# Patient Record
Sex: FEMALE | Race: WHITE | NOT HISPANIC OR LATINO | Employment: PART TIME | URBAN - METROPOLITAN AREA
[De-identification: names, ages, dates, MRNs, and addresses within clinical notes are randomized per-mention and may not be internally consistent; named-entity substitution may affect disease eponyms.]

---

## 2017-01-17 ENCOUNTER — GENERIC CONVERSION - ENCOUNTER (OUTPATIENT)
Dept: OTHER | Facility: OTHER | Age: 47
End: 2017-01-17

## 2017-02-07 ENCOUNTER — ALLSCRIPTS OFFICE VISIT (OUTPATIENT)
Dept: OTHER | Facility: OTHER | Age: 47
End: 2017-02-07

## 2017-02-10 LAB
HPV 18 (HISTORICAL): NOT DETECTED
HPV HIGH RISK 16/18 (HISTORICAL): NOT DETECTED
HPV16 (HISTORICAL): NOT DETECTED
PAP (HISTORICAL): NORMAL

## 2017-03-04 DIAGNOSIS — Z12.31 ENCOUNTER FOR SCREENING MAMMOGRAM FOR MALIGNANT NEOPLASM OF BREAST: ICD-10-CM

## 2017-03-10 ENCOUNTER — HOSPITAL ENCOUNTER (OUTPATIENT)
Dept: RADIOLOGY | Facility: HOSPITAL | Age: 47
Discharge: HOME | End: 2017-03-10
Attending: OBSTETRICS & GYNECOLOGY
Payer: COMMERCIAL

## 2017-03-10 DIAGNOSIS — Z12.31 ENCOUNTER FOR SCREENING MAMMOGRAM FOR MALIGNANT NEOPLASM OF BREAST: ICD-10-CM

## 2017-03-10 PROCEDURE — G0202 SCR MAMMO BI INCL CAD: HCPCS

## 2017-12-13 ENCOUNTER — GENERIC CONVERSION - ENCOUNTER (OUTPATIENT)
Dept: OTHER | Facility: OTHER | Age: 47
End: 2017-12-13

## 2018-01-11 NOTE — MISCELLANEOUS
Message  Pt informed ECC shows atypia/CIN1   advised repeat PAP/cotesting and ECC at 12 and 24 months  To begin 2/17  Questions answered  Signatures   Electronically signed by : Timmy Cohen DO;  Apr 16 2016 11:16AM EST                       (Author)

## 2018-01-12 VITALS
WEIGHT: 148 LBS | DIASTOLIC BLOOD PRESSURE: 62 MMHG | HEIGHT: 62 IN | BODY MASS INDEX: 27.23 KG/M2 | SYSTOLIC BLOOD PRESSURE: 120 MMHG

## 2018-01-15 NOTE — RESULT NOTES
Verified Results  (1) STOOL CULTURE 10Aug2016 07:29AM Laila Holcombmis   TW Order Number: [id number]     Test Name Result Flag Reference   CLINICAL REPORT (Report)     Test:        Stool culture  Specimen Source:  Rectum  Specimen Type:   Stool  Specimen Date:   8/10/2016 7:29 AM  Result Date:    8/14/2016 9:29 AM  Result Status:   Final result  Resulting Lab:   [place] [address]            Tel: [phone]      CULTURE                                       ------------------                                   No Salmonella, Shigella or Campylobacter isolated  Shiga Toxin 1 NOT detected, Shiga Toxin 2 NOT detected     (1) STOOL WBCS 10Aug2016 07:29AM Laila Jaycee    Order Number: [id number]     Test Name Result Flag Reference   STOOL WBC   None Seen   No white blood cells seen  Performed at:  [address] [place] [address]  [id number]  : Jonas Jameson MD, Phone:  [phone]     (1) OVA AND PARASITES EXAM [id number] 07:29AM Wyludivinawilian Champion    Order Number: [id number]     Test Name Result Flag Reference   O&P CONC  EXAM      No ova, cysts, or parasites seen     One negative specimen does not rule out the possibility of a  parasitic infection     Performed at:  [address] [place] [address]  [id number]  : Jonas Jameson MD, Phone:  [phone]

## 2018-01-17 NOTE — RESULT NOTES
Verified Results  (1) CBC/PLT/DIFF 10Aug2016 07:29AM Anitra Pan   TW Order Number: [id number]     Test Name Result Flag Reference   WBC COUNT 6 27 Thousand/uL  4 31-10 16   RBC COUNT 4 29 Million/uL  3 81-5 12   HEMOGLOBIN 13 4 g/dL  11 5-15 4   HEMATOCRIT 40 2 %  34 8-46  1   MCV 94 fL  82-98   MCH 31 2 pg  26 8-34 3   MCHC 33 3 g/dL  31 4-37 4   RDW 13 4 %  11 6-15 1   MPV 10 2 fL  8 9-12 7   PLATELET COUNT 085 Thousands/uL  149-390   nRBC AUTOMATED 0 /100 WBCs     NEUTROPHILS RELATIVE PERCENT 57 %  43-75   LYMPHOCYTES RELATIVE PERCENT 28 %  14-44   MONOCYTES RELATIVE PERCENT 11 %  4-12   EOSINOPHILS RELATIVE PERCENT 3 %  0-6   BASOPHILS RELATIVE PERCENT 1 %  0-1   NEUTROPHILS ABSOLUTE COUNT 3 57 Thousands/?L  1 85-7 62   LYMPHOCYTES ABSOLUTE COUNT 1 78 Thousands/?L  0 60-4 47   MONOCYTES ABSOLUTE COUNT 0 68 Thousand/?L  0 17-1 22   EOSINOPHILS ABSOLUTE COUNT 0 18 Thousand/?L  0 00-0 61   BASOPHILS ABSOLUTE COUNT 0 04 Thousands/?L  0 00-0 10   - Patient Instructions: This bloodwork is non-fasting  Please drink two glasses of water morning of bloodwork  - Patient Instructions: This bloodwork is non-fasting  Please drink two glasses of water morning of bloodwork  (1) COMPREHENSIVE METABOLIC PANEL [id number] 64:94YW Anitra Pan   Remind Order Number: [id number]     Test Name Result Flag Reference   GLUCOSE,RANDM 101 mg/dL     If the patient is fasting, the ADA then defines impaired fasting glucose as > 100 mg/dL and diabetes as > or equal to 123 mg/dL     SODIUM 138 mmol/L  136-145   POTASSIUM 4 3 mmol/L  3 5-5 3   CHLORIDE 107 mmol/L  100-108   CARBON DIOXIDE 22 mmol/L  21-32   ANION GAP (CALC) 9 mmol/L  4-13   BLOOD UREA NITROGEN 13 mg/dL  5-25   CREATININE 0 67 mg/dL  0 60-1 30   Standardized to IDMS reference method   CALCIUM 8 4 mg/dL  8 3-10 1   BILI, TOTAL 0 35 mg/dL  0 20-1 00   ALK PHOSPHATAS 42 U/L L    ALT (SGPT) 26 U/L  12-78   AST(SGOT) 17 U/L  5-45   ALBUMIN 3 6 g/dL  3 5-5 0   TOTAL PROTEIN 6 9 g/dL  6 4-8 2   eGFR Non-African American      >60 0 ml/min/1 73[address] [facility] Disease Education Program recommendations are as follows:  GFR calculation is accurate only with a steady state creatinine  Chronic Kidney disease less than 60 ml/min/1 73 sq  meters  Kidney failure less than 15 ml/min/1 73 sq  meters       (1) AMYLASE 10Aug2016 07:29AM Liv Kappa   TW Order Number: [id number]     Test Name Result Flag Reference   AMYLASE 51 IU/L       (1) LIPASE 10Aug2016 07:29AM Liv Kappa   TW Order Number: [id number]     Test Name Result Flag Reference   LIPASE 135 u/L

## 2018-01-18 NOTE — MISCELLANEOUS
Message   Recorded as Task   Date: 01/17/2017 04:02 PM, Created By: Clare Lane   Task Name: Med Renewal Request   Assigned To: Mary Peoples   Regarding Patient: Asa Bright, Status: Active   CommentMaire Draft - 17 Jan 2017 4:02 PM     TASK CREATED  Caller: Self; [phone] (Home); [phone] (Work)  pt lft vm wants rx to go to mail order pharm, [facility]        Active Problems    1  Abdominal pain, epigastric (789 06) (R10 13)   2  Abnormal Pap smear of cervix (795 00) (R87 619)   3  Abnormal Pap smear of cervix (795 00) (R87 619)   4  Candidiasis (112 9) (B37 9)   5  Change in stool (792 1) (R19 5)   6  Encounter for gynecological examination without abnormal finding (V72 31) (Z01 419)   7  Encounter for preventive health examination (V70 0) (Z00 00)   8  Encounter for routine gynecological examination (V72 31) (Z01 419)   9  Encounter for screening mammogram for malignant neoplasm of breast (V76 12)   (Z12 31)   10  Mild cervical dysplasia (622 11) (N87 0)   11  Oral contraceptive prescribed (V25 01) (Z30 011)   12  Palpitations (785 1) (R00 2)   13  Postcoital bleeding (626 7) (N93 0)   14  Screening for human papillomavirus (HPV) (V73 81) (Z11 51)    Current Meds   1  Calcium 500 MG TABS; TAKE 1 TABLET DAILY; Therapy: (ERAGHABJ:59MNW9789) to Recorded   2  Daily Multiple Vitamins TABS; TAKE 1 TABLET DAILY; Therapy: (VEGCORJU:58AIJ9144) to Recorded   3  Norethin Ace-Eth Estrad-FE 1-20 MG-MCG(24) Oral Tablet; Take 1 tablet daily as   directed; Therapy: 54FZM5448 to ()  Requested for: 75CAZ9293; Last   Rx:12Oct2016 Ordered    Allergies    1  No Known Drug Allergies    Plan  Health Maintenance, Oral contraceptive prescribed    · Norethin Ace-Eth Estrad-FE 1-20 MG-MCG(24) Oral Tablet;  Take 1 tablet daily as  directed    Signatures   Electronically signed by : Cassidy Bateman, ; Jan 17 2017  4:02PM EST                       (Author)

## 2018-01-24 VITALS
HEART RATE: 74 BPM | WEIGHT: 140.5 LBS | TEMPERATURE: 98.4 F | HEIGHT: 62 IN | SYSTOLIC BLOOD PRESSURE: 118 MMHG | DIASTOLIC BLOOD PRESSURE: 66 MMHG | BODY MASS INDEX: 25.86 KG/M2 | RESPIRATION RATE: 14 BRPM

## 2018-02-12 ENCOUNTER — OFFICE VISIT (OUTPATIENT)
Dept: OBGYN CLINIC | Facility: CLINIC | Age: 48
End: 2018-02-12
Payer: COMMERCIAL

## 2018-02-12 VITALS
DIASTOLIC BLOOD PRESSURE: 74 MMHG | WEIGHT: 143 LBS | BODY MASS INDEX: 26.31 KG/M2 | SYSTOLIC BLOOD PRESSURE: 118 MMHG | HEIGHT: 62 IN

## 2018-02-12 DIAGNOSIS — R53.83 FATIGUE, UNSPECIFIED TYPE: ICD-10-CM

## 2018-02-12 DIAGNOSIS — Z12.31 ENCOUNTER FOR SCREENING MAMMOGRAM FOR MALIGNANT NEOPLASM OF BREAST: Primary | ICD-10-CM

## 2018-02-12 PROCEDURE — S0612 ANNUAL GYNECOLOGICAL EXAMINA: HCPCS | Performed by: OBSTETRICS & GYNECOLOGY

## 2018-02-12 RX ORDER — MULTIVITAMIN/IRON/FOLIC ACID 18MG-0.4MG
1 TABLET ORAL DAILY
COMMUNITY

## 2018-02-12 RX ORDER — CALCIUM CARBONATE 500(1250)
1 TABLET ORAL 2 TIMES DAILY
COMMUNITY

## 2018-02-12 RX ORDER — NORETHINDRONE ACETATE AND ETHINYL ESTRADIOL AND FERROUS FUMARATE 1MG-20(24)
1 KIT ORAL DAILY
COMMUNITY
Start: 2017-03-23 | End: 2018-02-22 | Stop reason: SDUPTHER

## 2018-02-12 NOTE — PROGRESS NOTES
Assessment/Plan:    No problem-specific Assessment & Plan notes found for this encounter  Diagnoses and all orders for this visit:    Encounter for screening mammogram for malignant neoplasm of breast  -     Mammo screening bilateral w cad; Future    Fatigue, unspecified type  -     CBC  -     Comprehensive metabolic panel  -     Hemoglobin A1c  -     Lipid panel; Future  -     TSH, 3rd generation    Other orders  -     Calcium 500 MG tablet; Take 1 tablet by mouth daily  -     DAILY MULTIPLE VITAMINS/IRON TABS; Take 1 tablet by mouth daily  -     norethindrone-ethinyl estradiol-ferrous fumarate (JUNEL FE 24) 1-20 MG-MCG(24) per tablet; Take 1 tablet by mouth daily          Subjective:      Patient ID: Fely Hyman is a 50 y o  female  Patient returns for annual gyn visit  She has had 1 week of right lower quadrant abdominal discomfort  It appears burning in nature she has no nausea she has no fever  Bowel and bladder habits have been normal   Menses have been very minimal in flow and normal in timing  She has no upper GI complaints  Gynecologic Exam   The patient's pertinent negatives include no pelvic pain or vaginal discharge  Associated symptoms include abdominal pain  The following portions of the patient's history were reviewed and updated as appropriate: allergies, current medications, past family history, past medical history, past social history, past surgical history and problem list     Review of Systems   Constitutional: Negative  HENT: Negative  Eyes: Negative  Respiratory: Negative  Cardiovascular: Negative  Gastrointestinal: Positive for abdominal pain  Endocrine: Negative  Genitourinary: Negative for menstrual problem (normal flow and duration), pelvic pain, vaginal discharge and vaginal pain  Musculoskeletal: Negative  Skin: Negative  Allergic/Immunologic: Negative  Neurological: Negative  Psychiatric/Behavioral: Negative  Objective:    Vitals:    02/12/18 1825   BP: 118/74        Physical Exam   Constitutional: She is oriented to person, place, and time  She appears well-developed and well-nourished  HENT:   Head: Normocephalic and atraumatic  Nose: Nose normal    Eyes: EOM are normal  Pupils are equal, round, and reactive to light  Neck: Normal range of motion  Neck supple  No thyromegaly present  Cardiovascular: Normal rate and regular rhythm  Pulmonary/Chest: Effort normal and breath sounds normal  No respiratory distress  Breasts no masses, tenderness, skin changes   Abdominal: Soft  Bowel sounds are normal  She exhibits no distension and no mass  There is tenderness (minimal RLQ)  Hernia confirmed negative in the right inguinal area and confirmed negative in the left inguinal area  Genitourinary: Vagina normal and uterus normal  No breast swelling, tenderness, discharge or bleeding  Pelvic exam was performed with patient supine  No labial fusion  There is no rash, tenderness, lesion or injury on the right labia  There is no rash, tenderness, lesion or injury on the left labia  Cervix exhibits no motion tenderness, no discharge and no friability  Genitourinary Comments: Ext genitalia nl female w/o lesions  Cervix healthy w/o lesions or discharge  uterus nl size, NT, no mass  Adnexa NT, no mass   Musculoskeletal: Normal range of motion  She exhibits no edema  Lymphadenopathy:        Right: No inguinal adenopathy present  Left: No inguinal adenopathy present  Neurological: She is alert and oriented to person, place, and time  She has normal reflexes  Skin: Skin is warm and dry  No rash noted  Psychiatric: She has a normal mood and affect  Her behavior is normal  Thought content normal    Nursing note and vitals reviewed

## 2018-02-16 ENCOUNTER — TRANSCRIBE ORDERS (OUTPATIENT)
Dept: LAB | Facility: CLINIC | Age: 48
End: 2018-02-16

## 2018-02-16 ENCOUNTER — APPOINTMENT (OUTPATIENT)
Dept: LAB | Facility: CLINIC | Age: 48
End: 2018-02-16
Payer: COMMERCIAL

## 2018-02-16 DIAGNOSIS — R53.83 FATIGUE, UNSPECIFIED TYPE: ICD-10-CM

## 2018-02-16 LAB
ALBUMIN SERPL BCP-MCNC: 4 G/DL (ref 3.5–5)
ALP SERPL-CCNC: 32 U/L (ref 46–116)
ALT SERPL W P-5'-P-CCNC: 24 U/L (ref 12–78)
ANION GAP SERPL CALCULATED.3IONS-SCNC: 7 MMOL/L (ref 4–13)
AST SERPL W P-5'-P-CCNC: 14 U/L (ref 5–45)
BILIRUB SERPL-MCNC: 0.82 MG/DL (ref 0.2–1)
BUN SERPL-MCNC: 10 MG/DL (ref 5–25)
CALCIUM SERPL-MCNC: 8.5 MG/DL (ref 8.3–10.1)
CHLORIDE SERPL-SCNC: 108 MMOL/L (ref 100–108)
CHOLEST SERPL-MCNC: 154 MG/DL (ref 50–200)
CO2 SERPL-SCNC: 25 MMOL/L (ref 21–32)
CREAT SERPL-MCNC: 0.63 MG/DL (ref 0.6–1.3)
ERYTHROCYTE [DISTWIDTH] IN BLOOD BY AUTOMATED COUNT: 13.3 % (ref 11.6–15.1)
EST. AVERAGE GLUCOSE BLD GHB EST-MCNC: 105 MG/DL
GFR SERPL CREATININE-BSD FRML MDRD: 106 ML/MIN/1.73SQ M
GLUCOSE P FAST SERPL-MCNC: 85 MG/DL (ref 65–99)
HBA1C MFR BLD: 5.3 % (ref 4.2–6.3)
HCT VFR BLD AUTO: 41 % (ref 34.8–46.1)
HDLC SERPL-MCNC: 42 MG/DL (ref 40–60)
HGB BLD-MCNC: 13.6 G/DL (ref 11.5–15.4)
LDLC SERPL CALC-MCNC: 99 MG/DL (ref 0–100)
MCH RBC QN AUTO: 31.1 PG (ref 26.8–34.3)
MCHC RBC AUTO-ENTMCNC: 33.2 G/DL (ref 31.4–37.4)
MCV RBC AUTO: 94 FL (ref 82–98)
PLATELET # BLD AUTO: 320 THOUSANDS/UL (ref 149–390)
PMV BLD AUTO: 10.2 FL (ref 8.9–12.7)
POTASSIUM SERPL-SCNC: 4.2 MMOL/L (ref 3.5–5.3)
PROT SERPL-MCNC: 7 G/DL (ref 6.4–8.2)
RBC # BLD AUTO: 4.37 MILLION/UL (ref 3.81–5.12)
SODIUM SERPL-SCNC: 140 MMOL/L (ref 136–145)
TRIGL SERPL-MCNC: 65 MG/DL
TSH SERPL DL<=0.05 MIU/L-ACNC: 1.24 UIU/ML (ref 0.36–3.74)
WBC # BLD AUTO: 4.77 THOUSAND/UL (ref 4.31–10.16)

## 2018-02-16 PROCEDURE — 36415 COLL VENOUS BLD VENIPUNCTURE: CPT | Performed by: OBSTETRICS & GYNECOLOGY

## 2018-02-16 PROCEDURE — 80061 LIPID PANEL: CPT

## 2018-02-16 PROCEDURE — 85027 COMPLETE CBC AUTOMATED: CPT | Performed by: OBSTETRICS & GYNECOLOGY

## 2018-02-16 PROCEDURE — 80053 COMPREHEN METABOLIC PANEL: CPT | Performed by: OBSTETRICS & GYNECOLOGY

## 2018-02-16 PROCEDURE — 83036 HEMOGLOBIN GLYCOSYLATED A1C: CPT | Performed by: OBSTETRICS & GYNECOLOGY

## 2018-02-16 PROCEDURE — 84443 ASSAY THYROID STIM HORMONE: CPT | Performed by: OBSTETRICS & GYNECOLOGY

## 2018-02-22 DIAGNOSIS — Z30.41 ENCOUNTER FOR SURVEILLANCE OF CONTRACEPTIVE PILLS: Primary | ICD-10-CM

## 2018-02-22 RX ORDER — NORETHINDRONE ACETATE AND ETHINYL ESTRADIOL AND FERROUS FUMARATE 1MG-20(24)
KIT ORAL
Qty: 84 TABLET | Refills: 3 | Status: SHIPPED | OUTPATIENT
Start: 2018-02-22 | End: 2019-01-25 | Stop reason: SDUPTHER

## 2018-02-23 ENCOUNTER — TRANSCRIBE ORDERS (OUTPATIENT)
Dept: ADMINISTRATIVE | Facility: HOSPITAL | Age: 48
End: 2018-02-23

## 2018-02-23 ENCOUNTER — TELEPHONE (OUTPATIENT)
Dept: OBGYN CLINIC | Facility: CLINIC | Age: 48
End: 2018-02-23

## 2018-02-23 DIAGNOSIS — R10.2 PELVIC PAIN: Primary | ICD-10-CM

## 2018-02-23 DIAGNOSIS — R52 PAIN: Primary | ICD-10-CM

## 2018-02-23 NOTE — TELEPHONE ENCOUNTER
Pt seen recently for yrly discussed this dull side sticker pain she was having, had normal bw but the pain is still there what can she do for this?

## 2018-02-26 ENCOUNTER — APPOINTMENT (OUTPATIENT)
Dept: LAB | Facility: CLINIC | Age: 48
End: 2018-02-26
Payer: COMMERCIAL

## 2018-02-26 DIAGNOSIS — R52 PAIN: ICD-10-CM

## 2018-02-26 LAB
BACTERIA UR QL AUTO: ABNORMAL /HPF
BILIRUB UR QL STRIP: NEGATIVE
CLARITY UR: CLEAR
COLOR UR: YELLOW
GLUCOSE UR STRIP-MCNC: NEGATIVE MG/DL
HGB UR QL STRIP.AUTO: ABNORMAL
KETONES UR STRIP-MCNC: NEGATIVE MG/DL
LEUKOCYTE ESTERASE UR QL STRIP: ABNORMAL
NITRITE UR QL STRIP: NEGATIVE
NON-SQ EPI CELLS URNS QL MICRO: ABNORMAL /HPF
PH UR STRIP.AUTO: 6 [PH] (ref 4.5–8)
PROT UR STRIP-MCNC: NEGATIVE MG/DL
RBC #/AREA URNS AUTO: ABNORMAL /HPF
SP GR UR STRIP.AUTO: 1.02 (ref 1–1.03)
UROBILINOGEN UR QL STRIP.AUTO: 0.2 E.U./DL
WBC #/AREA URNS AUTO: ABNORMAL /HPF

## 2018-02-26 PROCEDURE — 81001 URINALYSIS AUTO W/SCOPE: CPT

## 2018-02-26 PROCEDURE — 87086 URINE CULTURE/COLONY COUNT: CPT

## 2018-02-27 ENCOUNTER — HOSPITAL ENCOUNTER (OUTPATIENT)
Dept: RADIOLOGY | Facility: HOSPITAL | Age: 48
Discharge: HOME | End: 2018-02-27
Attending: OBSTETRICS & GYNECOLOGY

## 2018-02-27 ENCOUNTER — TRANSCRIBE ORDERS (OUTPATIENT)
Dept: RADIOLOGY | Facility: HOSPITAL | Age: 48
End: 2018-02-27

## 2018-02-27 ENCOUNTER — TELEPHONE (OUTPATIENT)
Dept: OBGYN CLINIC | Facility: CLINIC | Age: 48
End: 2018-02-27

## 2018-02-27 DIAGNOSIS — R10.10 PAIN OF UPPER ABDOMEN: Primary | ICD-10-CM

## 2018-02-27 DIAGNOSIS — R10.2 PELVIC PAIN: ICD-10-CM

## 2018-02-27 LAB — BACTERIA UR CULT: NORMAL

## 2018-02-27 NOTE — TELEPHONE ENCOUNTER
Radiology called - patient is currently there to have a pelvic u/s done  But when the tech was about to do it, the patient was describing the pain and location, she pointed to her upper right abdomen, above the belly button  The tech stated that with the current order of the pelvic u/s, that it won't cover the area patient is complaining about  OK to put in a Abdominal u/s? Please advise  Thanks!

## 2018-03-01 ENCOUNTER — TELEPHONE (OUTPATIENT)
Dept: OBGYN CLINIC | Facility: MEDICAL CENTER | Age: 48
End: 2018-03-01

## 2018-03-01 DIAGNOSIS — R10.11 RIGHT UPPER QUADRANT ABDOMINAL PAIN: Primary | ICD-10-CM

## 2018-03-02 ENCOUNTER — HOSPITAL ENCOUNTER (OUTPATIENT)
Dept: RADIOLOGY | Facility: HOSPITAL | Age: 48
Discharge: HOME | End: 2018-03-02
Attending: OBSTETRICS & GYNECOLOGY
Payer: COMMERCIAL

## 2018-03-02 DIAGNOSIS — R10.11 RIGHT UPPER QUADRANT ABDOMINAL PAIN: ICD-10-CM

## 2018-03-02 PROCEDURE — 76705 ECHO EXAM OF ABDOMEN: CPT

## 2018-03-13 ENCOUNTER — OFFICE VISIT (OUTPATIENT)
Dept: FAMILY MEDICINE CLINIC | Facility: CLINIC | Age: 48
End: 2018-03-13
Payer: COMMERCIAL

## 2018-03-13 VITALS
HEIGHT: 62 IN | SYSTOLIC BLOOD PRESSURE: 116 MMHG | HEART RATE: 70 BPM | WEIGHT: 143.8 LBS | TEMPERATURE: 98.3 F | RESPIRATION RATE: 16 BRPM | DIASTOLIC BLOOD PRESSURE: 72 MMHG | BODY MASS INDEX: 26.46 KG/M2

## 2018-03-13 DIAGNOSIS — R10.11 RUQ DISCOMFORT: Primary | ICD-10-CM

## 2018-03-13 PROCEDURE — 99213 OFFICE O/P EST LOW 20 MIN: CPT | Performed by: FAMILY MEDICINE

## 2018-03-13 PROCEDURE — 3008F BODY MASS INDEX DOCD: CPT | Performed by: FAMILY MEDICINE

## 2018-03-13 PROCEDURE — 1036F TOBACCO NON-USER: CPT | Performed by: FAMILY MEDICINE

## 2018-03-13 NOTE — PROGRESS NOTES
Assessment/Plan:    No problem-specific Assessment & Plan notes found for this encounter  Diagnoses and all orders for this visit:    RUQ discomfort      I was unable to elicit anything today on physical exam that was concerning, the patient's symptoms do not have any red flags, I would recommend that we wait and watch and if her symptoms persist or worsen then we can consider further evaluation possible with further imaging or CT  In the meantime, it is not unreasonable for her to trial Advil to see if it makes any improvement, or also she could trial Protonix to see if it makes any improvement in her symptoms  She was agreeable with this plan all questions were asked and answered    Subjective:      Patient ID: Heike Garcia is a 50 y o  female  51 yo f with 1 month of "dull awareness" of RUQ  Had labs (all normal) and RUQ u/s (normal) and has no constipation, diarrhea, nausea or vomiting  No recent infections  Urinalysis was normal  She does not describe pain and has no change in bowel habits or urinary habits  She finds the feeling is sporadic and not related to movement, time of day or activity  She does not have a history of abdominal surgery  She has had two pregnancies  She has no abnormal bleeding  The following portions of the patient's history were reviewed and updated as appropriate: allergies, current medications, past family history, past medical history, past social history, past surgical history and problem list     Review of Systems   Constitutional: Negative for chills, fatigue, fever and unexpected weight change  HENT: Negative for hearing loss, postnasal drip and sore throat  Eyes: Negative for discharge and visual disturbance  Respiratory: Negative for shortness of breath  Cardiovascular: Negative for chest pain  Gastrointestinal: Negative for constipation, diarrhea, nausea and vomiting          As per hpi   Genitourinary: Negative for dysuria, menstrual problem and pelvic pain  No incontinence   Musculoskeletal: Negative for arthralgias and myalgias  Skin: Negative for rash  Neurological: Negative for headaches  Psychiatric/Behavioral:        No anxiety or depression   All other systems reviewed and are negative  Objective:      /72   Pulse 70   Temp 98 3 °F (36 8 °C)   Resp 16   Ht 5' 2" (1 575 m)   Wt 65 2 kg (143 lb 12 8 oz)   BMI 26 30 kg/m²          Physical Exam   Constitutional: She is oriented to person, place, and time  Vital signs are normal  She appears well-developed and well-nourished  Non-toxic appearance  No distress  Appears Stated Age   HENT:   Head: Normocephalic and atraumatic  Nose: Nose normal    Mouth/Throat: Oropharynx is clear and moist  No oropharyngeal exudate  Eyes: Conjunctivae and EOM are normal  Pupils are equal, round, and reactive to light  Right eye exhibits no discharge  Left eye exhibits no discharge  Left conjunctiva is not injected  No scleral icterus  Right eye exhibits no nystagmus  Left eye exhibits no nystagmus  Accomodation intact   Neck: Normal range of motion  Neck supple  Carotid bruit is not present  Cardiovascular: Normal rate, regular rhythm, S1 normal and S2 normal   Exam reveals no gallop and no friction rub  No murmur heard  Pulmonary/Chest: Effort normal and breath sounds normal  No respiratory distress  She has no wheezes  She has no rhonchi  She has no rales  Abdominal: Soft  Bowel sounds are normal  She exhibits no distension  There is no tenderness  There is no rebound and no guarding  No hernias, no palpable deficits, no pain with palpation of abdominal muscles, straight leg test negative, no psoas sign, no tenderness at McBurney's point, unable to elicit discomfort on physical exam while supine, no CVA tenderness   Musculoskeletal: She exhibits no edema  No clubbing or cyanosis   Lymphadenopathy:     She has no cervical adenopathy     Neurological: She is alert and oriented to person, place, and time  No focal neurologic deficits noted on exam, no change from baseline status   Skin: Skin is warm and dry  No rash noted  She is not diaphoretic  Psychiatric: She has a normal mood and affect  Her behavior is normal  Thought content normal    stable   Vitals reviewed

## 2018-03-16 ENCOUNTER — HOSPITAL ENCOUNTER (OUTPATIENT)
Dept: RADIOLOGY | Facility: HOSPITAL | Age: 48
Discharge: HOME | End: 2018-03-16
Payer: COMMERCIAL

## 2018-03-16 DIAGNOSIS — Z12.31 ENCOUNTER FOR SCREENING MAMMOGRAM FOR MALIGNANT NEOPLASM OF BREAST: ICD-10-CM

## 2018-03-16 PROCEDURE — 77067 SCR MAMMO BI INCL CAD: CPT

## 2019-01-25 DIAGNOSIS — Z30.41 ENCOUNTER FOR SURVEILLANCE OF CONTRACEPTIVE PILLS: ICD-10-CM

## 2019-01-25 RX ORDER — NORETHINDRONE ACETATE AND ETHINYL ESTRADIOL AND FERROUS FUMARATE 1MG-20(24)
KIT ORAL
Qty: 84 TABLET | Refills: 3 | Status: SHIPPED | OUTPATIENT
Start: 2019-01-25 | End: 2019-12-27 | Stop reason: SDUPTHER

## 2019-02-05 ENCOUNTER — ANNUAL EXAM (OUTPATIENT)
Dept: OBGYN CLINIC | Facility: CLINIC | Age: 49
End: 2019-02-05
Payer: COMMERCIAL

## 2019-02-05 VITALS
DIASTOLIC BLOOD PRESSURE: 60 MMHG | HEIGHT: 62 IN | WEIGHT: 138.8 LBS | BODY MASS INDEX: 25.54 KG/M2 | SYSTOLIC BLOOD PRESSURE: 110 MMHG

## 2019-02-05 DIAGNOSIS — Z12.39 SCREENING FOR MALIGNANT NEOPLASM OF BREAST: ICD-10-CM

## 2019-02-05 DIAGNOSIS — Z01.419 ENCOUNTER FOR GYNECOLOGICAL EXAMINATION (GENERAL) (ROUTINE) WITHOUT ABNORMAL FINDINGS: Primary | ICD-10-CM

## 2019-02-05 PROCEDURE — S0612 ANNUAL GYNECOLOGICAL EXAMINA: HCPCS | Performed by: OBSTETRICS & GYNECOLOGY

## 2019-02-05 NOTE — PROGRESS NOTES
Assessment/Plan:    No problem-specific Assessment & Plan notes found for this encounter  Diagnoses and all orders for this visit:    Encounter for gynecological examination (general) (routine) without abnormal findings    Screening for malignant neoplasm of breast  -     Mammo screening bilateral w cad; Future        Annual examination was completed  Mammogram was ordered  We discussed continuation of OCPs until at least age 46 possibly age 46  Patient to return to the office in 1 year or as necessary  Subjective:      Patient ID: Fadi Sánchez is a 50 y o  female  Patient returns for annual gyn visit  She has no new medical surgical issues  She did have a right upper quadrant ultrasound due to some discomfort last year that revealed no abnormalities  Her discomfort resolved spontaneously she feels that that was due to musculoskeletal issues  She has minimal to no menses on OCPs  She did just recently moved to the [facility] and is doing well  Gynecologic Exam         The following portions of the patient's history were reviewed and updated as appropriate:   She  has no past medical history on file  She   Patient Active Problem List    Diagnosis Date Noted    Encounter for gynecological examination (general) (routine) without abnormal findings 02/05/2019    Screening for malignant neoplasm of breast 02/05/2019     She  has no past surgical history on file  Her family history includes Diabetes in her father; Hodgkin's lymphoma in her father  She  reports that she has never smoked  She has never used smokeless tobacco  She reports that she drinks alcohol  She reports that she does not use drugs    Current Outpatient Prescriptions   Medication Sig Dispense Refill    Calcium 500 MG tablet Take 1 tablet by mouth daily      DAILY MULTIPLE VITAMINS/IRON TABS Take 1 tablet by mouth daily      JUNEL FE 24 1-20 MG-MCG(24) per tablet TAKE 1 TABLET DAILY AS DIRECTED 84 tablet 3     No current facility-administered medications for this visit  Current Outpatient Prescriptions on File Prior to Visit   Medication Sig    Calcium 500 MG tablet Take 1 tablet by mouth daily    DAILY MULTIPLE VITAMINS/IRON TABS Take 1 tablet by mouth daily    JUNEL FE 24 1-20 MG-MCG(24) per tablet TAKE 1 TABLET DAILY AS DIRECTED     No current facility-administered medications on file prior to visit  She has No Known Allergies       Review of Systems   All other systems reviewed and are negative  Objective:      /60 (BP Location: Right arm, Patient Position: Sitting, Cuff Size: Standard)   Ht 5' 1 5" (1 562 m)   Wt 63 kg (138 lb 12 8 oz)   BMI 25 80 kg/m²          Physical Exam   Constitutional: She is oriented to person, place, and time  She appears well-developed and well-nourished  HENT:   Head: Normocephalic and atraumatic  Nose: Nose normal    Eyes: Pupils are equal, round, and reactive to light  EOM are normal    Neck: Normal range of motion  Neck supple  No thyromegaly present  Cardiovascular: Normal rate and regular rhythm  Pulmonary/Chest: Effort normal and breath sounds normal  No respiratory distress  Breasts no masses, tenderness, skin changes   Abdominal: Soft  Bowel sounds are normal  She exhibits no distension and no mass  There is no tenderness  Hernia confirmed negative in the right inguinal area and confirmed negative in the left inguinal area  Genitourinary: Vagina normal and uterus normal  No breast swelling, tenderness, discharge or bleeding  Pelvic exam was performed with patient supine  No labial fusion  There is no rash, tenderness, lesion or injury on the right labia  There is no rash, tenderness, lesion or injury on the left labia  Cervix exhibits no motion tenderness, no discharge and no friability     Genitourinary Comments: Ext genitalia nl female w/o lesions  Vag healthy without lesions or discharge  Cervix healthy w/o lesions or discharge  uterus nl size, NT, no mass  Adnexa NT, no mass   Musculoskeletal: Normal range of motion  She exhibits no edema  Lymphadenopathy:        Right: No inguinal adenopathy present  Left: No inguinal adenopathy present  Neurological: She is alert and oriented to person, place, and time  She has normal reflexes  Skin: Skin is warm and dry  No rash noted  Psychiatric: She has a normal mood and affect  Her behavior is normal  Thought content normal    Nursing note and vitals reviewed

## 2019-03-22 ENCOUNTER — HOSPITAL ENCOUNTER (OUTPATIENT)
Dept: RADIOLOGY | Facility: HOSPITAL | Age: 49
Discharge: HOME | End: 2019-03-22
Payer: COMMERCIAL

## 2019-03-22 VITALS — BODY MASS INDEX: 24.29 KG/M2 | WEIGHT: 132 LBS | HEIGHT: 62 IN

## 2019-03-22 DIAGNOSIS — Z12.39 SCREENING BREAST EXAMINATION: ICD-10-CM

## 2019-03-22 PROCEDURE — 77067 SCR MAMMO BI INCL CAD: CPT

## 2019-12-27 DIAGNOSIS — Z30.41 ENCOUNTER FOR SURVEILLANCE OF CONTRACEPTIVE PILLS: ICD-10-CM

## 2019-12-31 RX ORDER — NORETHINDRONE ACETATE AND ETHINYL ESTRADIOL AND FERROUS FUMARATE 1MG-20(24)
KIT ORAL
Qty: 84 TABLET | Refills: 4 | Status: SHIPPED | OUTPATIENT
Start: 2019-12-31 | End: 2021-01-26

## 2020-01-24 ENCOUNTER — OFFICE VISIT (OUTPATIENT)
Dept: FAMILY MEDICINE CLINIC | Facility: CLINIC | Age: 50
End: 2020-01-24
Payer: COMMERCIAL

## 2020-01-24 VITALS
WEIGHT: 133.2 LBS | BODY MASS INDEX: 24.51 KG/M2 | HEART RATE: 76 BPM | DIASTOLIC BLOOD PRESSURE: 76 MMHG | HEIGHT: 62 IN | SYSTOLIC BLOOD PRESSURE: 138 MMHG | OXYGEN SATURATION: 98 % | RESPIRATION RATE: 16 BRPM

## 2020-01-24 DIAGNOSIS — Z11.4 SCREENING FOR HIV (HUMAN IMMUNODEFICIENCY VIRUS): ICD-10-CM

## 2020-01-24 DIAGNOSIS — Z23 NEED FOR TDAP VACCINATION: ICD-10-CM

## 2020-01-24 DIAGNOSIS — Z12.11 SCREENING FOR COLORECTAL CANCER: ICD-10-CM

## 2020-01-24 DIAGNOSIS — Z00.00 ANNUAL PHYSICAL EXAM: Primary | ICD-10-CM

## 2020-01-24 DIAGNOSIS — Z12.12 SCREENING FOR COLORECTAL CANCER: ICD-10-CM

## 2020-01-24 DIAGNOSIS — Z13.1 DIABETES MELLITUS SCREENING: ICD-10-CM

## 2020-01-24 DIAGNOSIS — Z13.220 LIPID SCREENING: ICD-10-CM

## 2020-01-24 DIAGNOSIS — Z12.31 ENCOUNTER FOR SCREENING MAMMOGRAM FOR BREAST CANCER: ICD-10-CM

## 2020-01-24 PROCEDURE — 90715 TDAP VACCINE 7 YRS/> IM: CPT

## 2020-01-24 PROCEDURE — 99396 PREV VISIT EST AGE 40-64: CPT | Performed by: PHYSICIAN ASSISTANT

## 2020-01-24 PROCEDURE — 90471 IMMUNIZATION ADMIN: CPT

## 2020-01-31 ENCOUNTER — APPOINTMENT (OUTPATIENT)
Dept: LAB | Facility: MEDICAL CENTER | Age: 50
End: 2020-01-31
Payer: COMMERCIAL

## 2020-01-31 DIAGNOSIS — Z13.220 LIPID SCREENING: ICD-10-CM

## 2020-01-31 DIAGNOSIS — Z11.4 SCREENING FOR HIV (HUMAN IMMUNODEFICIENCY VIRUS): ICD-10-CM

## 2020-01-31 DIAGNOSIS — Z00.00 ANNUAL PHYSICAL EXAM: ICD-10-CM

## 2020-01-31 LAB
ALBUMIN SERPL BCP-MCNC: 3.8 G/DL (ref 3.5–5)
ALP SERPL-CCNC: 36 U/L (ref 46–116)
ALT SERPL W P-5'-P-CCNC: 26 U/L (ref 12–78)
ANION GAP SERPL CALCULATED.3IONS-SCNC: 2 MMOL/L (ref 4–13)
AST SERPL W P-5'-P-CCNC: 16 U/L (ref 5–45)
BASOPHILS # BLD AUTO: 0.06 THOUSANDS/ΜL (ref 0–0.1)
BASOPHILS NFR BLD AUTO: 1 % (ref 0–1)
BILIRUB SERPL-MCNC: 0.38 MG/DL (ref 0.2–1)
BUN SERPL-MCNC: 15 MG/DL (ref 5–25)
CALCIUM SERPL-MCNC: 8.9 MG/DL (ref 8.3–10.1)
CHLORIDE SERPL-SCNC: 108 MMOL/L (ref 100–108)
CHOLEST SERPL-MCNC: 157 MG/DL (ref 50–200)
CO2 SERPL-SCNC: 28 MMOL/L (ref 21–32)
CREAT SERPL-MCNC: 0.63 MG/DL (ref 0.6–1.3)
EOSINOPHIL # BLD AUTO: 0.17 THOUSAND/ΜL (ref 0–0.61)
EOSINOPHIL NFR BLD AUTO: 3 % (ref 0–6)
ERYTHROCYTE [DISTWIDTH] IN BLOOD BY AUTOMATED COUNT: 12.9 % (ref 11.6–15.1)
GFR SERPL CREATININE-BSD FRML MDRD: 106 ML/MIN/1.73SQ M
GLUCOSE P FAST SERPL-MCNC: 89 MG/DL (ref 65–99)
HCT VFR BLD AUTO: 45.2 % (ref 34.8–46.1)
HDLC SERPL-MCNC: 53 MG/DL
HGB BLD-MCNC: 14.6 G/DL (ref 11.5–15.4)
IMM GRANULOCYTES # BLD AUTO: 0.02 THOUSAND/UL (ref 0–0.2)
IMM GRANULOCYTES NFR BLD AUTO: 0 % (ref 0–2)
LDLC SERPL CALC-MCNC: 95 MG/DL (ref 0–100)
LYMPHOCYTES # BLD AUTO: 1.56 THOUSANDS/ΜL (ref 0.6–4.47)
LYMPHOCYTES NFR BLD AUTO: 23 % (ref 14–44)
MCH RBC QN AUTO: 31.3 PG (ref 26.8–34.3)
MCHC RBC AUTO-ENTMCNC: 32.3 G/DL (ref 31.4–37.4)
MCV RBC AUTO: 97 FL (ref 82–98)
MONOCYTES # BLD AUTO: 0.57 THOUSAND/ΜL (ref 0.17–1.22)
MONOCYTES NFR BLD AUTO: 9 % (ref 4–12)
NEUTROPHILS # BLD AUTO: 4.31 THOUSANDS/ΜL (ref 1.85–7.62)
NEUTS SEG NFR BLD AUTO: 64 % (ref 43–75)
NRBC BLD AUTO-RTO: 0 /100 WBCS
PLATELET # BLD AUTO: 358 THOUSANDS/UL (ref 149–390)
PMV BLD AUTO: 10 FL (ref 8.9–12.7)
POTASSIUM SERPL-SCNC: 4.4 MMOL/L (ref 3.5–5.3)
PROT SERPL-MCNC: 7.1 G/DL (ref 6.4–8.2)
RBC # BLD AUTO: 4.67 MILLION/UL (ref 3.81–5.12)
SODIUM SERPL-SCNC: 138 MMOL/L (ref 136–145)
TRIGL SERPL-MCNC: 46 MG/DL
TSH SERPL DL<=0.05 MIU/L-ACNC: 1.07 UIU/ML (ref 0.36–3.74)
WBC # BLD AUTO: 6.69 THOUSAND/UL (ref 4.31–10.16)

## 2020-01-31 PROCEDURE — 80053 COMPREHEN METABOLIC PANEL: CPT | Performed by: PHYSICIAN ASSISTANT

## 2020-01-31 PROCEDURE — 87389 HIV-1 AG W/HIV-1&-2 AB AG IA: CPT

## 2020-01-31 PROCEDURE — 85025 COMPLETE CBC W/AUTO DIFF WBC: CPT | Performed by: PHYSICIAN ASSISTANT

## 2020-01-31 PROCEDURE — 36415 COLL VENOUS BLD VENIPUNCTURE: CPT

## 2020-01-31 PROCEDURE — 84443 ASSAY THYROID STIM HORMONE: CPT | Performed by: PHYSICIAN ASSISTANT

## 2020-01-31 PROCEDURE — 80061 LIPID PANEL: CPT

## 2020-02-04 LAB — HIV 1+2 AB+HIV1 P24 AG SERPL QL IA: NORMAL

## 2020-02-07 ENCOUNTER — ANNUAL EXAM (OUTPATIENT)
Dept: GYNECOLOGY | Facility: CLINIC | Age: 50
End: 2020-02-07
Payer: COMMERCIAL

## 2020-02-07 VITALS
WEIGHT: 134 LBS | HEIGHT: 63 IN | SYSTOLIC BLOOD PRESSURE: 122 MMHG | BODY MASS INDEX: 23.74 KG/M2 | DIASTOLIC BLOOD PRESSURE: 78 MMHG

## 2020-02-07 DIAGNOSIS — Z01.419 ENCOUNTER FOR GYNECOLOGICAL EXAMINATION WITHOUT ABNORMAL FINDING: Primary | ICD-10-CM

## 2020-02-07 DIAGNOSIS — Z01.419 ENCOUNTER FOR ANNUAL ROUTINE GYNECOLOGICAL EXAMINATION: ICD-10-CM

## 2020-02-07 PROCEDURE — 3008F BODY MASS INDEX DOCD: CPT | Performed by: OBSTETRICS & GYNECOLOGY

## 2020-02-07 PROCEDURE — G0145 SCR C/V CYTO,THINLAYER,RESCR: HCPCS | Performed by: OBSTETRICS & GYNECOLOGY

## 2020-02-07 PROCEDURE — S0610 ANNUAL GYNECOLOGICAL EXAMINA: HCPCS | Performed by: OBSTETRICS & GYNECOLOGY

## 2020-02-07 NOTE — PROGRESS NOTES
Assessment/Plan:        Patient will discontinue oral contraceptives at the end of this pack  If she has menopausal symptoms and cannot tolerate them she will contact the office  Encounter for gynecological examination without abnormal finding      Colonoscopy recommended    Subjective:      Patient ID: Stacia Martinez is a 48 y o  female  HPI P 2 presents to the office for annual examination  She offers no complaints  She is presently on oral contraceptives  Does not complain of headaches, vision changes chest pain or calf tenderness  She has no menses on oral contraceptives  Denies any urinary or GI complaints  She has a history of cervical dysplasia several years ago treated with a cone biopsy    The following portions of the patient's history were reviewed and updated as appropriate:   She  has no past medical history on file  She   Patient Active Problem List    Diagnosis Date Noted    Encounter for gynecological examination (general) (routine) without abnormal findings 02/05/2019    Screening for malignant neoplasm of breast 02/05/2019    Cervical intraepithelial neoplasia grade 1 01/07/2014    Abnormal Pap smear of cervix 07/02/2013     She  has a past surgical history that includes Cervical cone biopsy  Her family history includes Cervical cancer in her sister; Coronary artery disease in her father; Diabetes in her father; Lymphoma in her father; Other in her brother; Squamous cell carcinoma in her mother  She  reports that she has never smoked  She has never used smokeless tobacco  She reports that she drinks about 7 0 standard drinks of alcohol per week  She reports that she does not use drugs    Current Outpatient Medications   Medication Sig Dispense Refill    Calcium 500 MG tablet Take 1 tablet by mouth 2 (two) times a day      DAILY MULTIPLE VITAMINS/IRON TABS Take 1 tablet by mouth daily      JUNEL FE 24 1-20 MG-MCG(24) per tablet TAKE 1 TABLET DAILY AS DIRECTED 84 tablet 4 No current facility-administered medications for this visit  Current Outpatient Medications on File Prior to Visit   Medication Sig    Calcium 500 MG tablet Take 1 tablet by mouth 2 (two) times a day    DAILY MULTIPLE VITAMINS/IRON TABS Take 1 tablet by mouth daily    JUNEL FE 24 1-20 MG-MCG(24) per tablet TAKE 1 TABLET DAILY AS DIRECTED     No current facility-administered medications on file prior to visit  She has No Known Allergies       Review of Systems   Constitutional: Negative  HENT: Negative for sore throat and trouble swallowing  Gastrointestinal: Negative  Genitourinary: Negative  Objective:      /78   Ht 5' 2 5" (1 588 m)   Wt 60 8 kg (134 lb)   BMI 24 12 kg/m²          Physical Exam   Constitutional: She appears well-developed and well-nourished  Neck: Normal range of motion  Neck supple  No thyromegaly present  Cardiovascular: Normal rate, regular rhythm and normal heart sounds  Pulmonary/Chest: Effort normal and breath sounds normal  No respiratory distress  Right breast exhibits no inverted nipple, no mass, no nipple discharge, no skin change and no tenderness  Left breast exhibits no inverted nipple, no mass, no nipple discharge, no skin change and no tenderness  Abdominal: Soft  Bowel sounds are normal  She exhibits no distension and no mass  There is no tenderness  There is no rebound and no guarding  No hernia  Hernia confirmed negative in the right inguinal area and confirmed negative in the left inguinal area  Genitourinary: There is no rash, tenderness or lesion on the right labia  There is no rash, tenderness or lesion on the left labia  Uterus is not deviated, not enlarged, not fixed and not tender  Cervix exhibits no motion tenderness, no discharge and no friability  Right adnexum displays no mass, no tenderness and no fullness  Left adnexum displays no mass, no tenderness and no fullness   No erythema, tenderness or bleeding in the vagina  No vaginal discharge found  Lymphadenopathy:     She has no cervical adenopathy  No inguinal adenopathy noted on the right or left side

## 2020-02-12 LAB
LAB AP GYN PRIMARY INTERPRETATION: NORMAL
Lab: NORMAL

## 2020-02-14 ENCOUNTER — TELEPHONE (OUTPATIENT)
Dept: GASTROENTEROLOGY | Facility: AMBULARY SURGERY CENTER | Age: 50
End: 2020-02-14

## 2020-02-14 NOTE — TELEPHONE ENCOUNTER
Patients GI provider:  new    Number to return call: ( [phone]    Reason for call: Pt calling to schedule colon, passed oa    Scheduled procedure/appointment date if applicable: Apt/procedure  na

## 2020-02-25 ENCOUNTER — PREP FOR PROCEDURE (OUTPATIENT)
Dept: GASTROENTEROLOGY | Facility: MEDICAL CENTER | Age: 50
End: 2020-02-25

## 2020-02-25 DIAGNOSIS — Z12.11 SCREENING FOR COLON CANCER: Primary | ICD-10-CM

## 2020-02-25 NOTE — TELEPHONE ENCOUNTER
Pt is scheduled at [facility] with dr Coreen Lester for oa colon on 3/21/20, I went over miralax prep with pt and mailed out to pt home  Patient is aware she will need a  to and from   She will get a call the day before with an exact time for arrival

## 2020-03-13 ENCOUNTER — TELEPHONE (OUTPATIENT)
Dept: GASTROENTEROLOGY | Facility: MEDICAL CENTER | Age: 50
End: 2020-03-13

## 2020-03-13 NOTE — TELEPHONE ENCOUNTER
Pt called to reschedule procedure due to fear of corona virus   She asked to be scheduled in April I moved pt to dr Shane Alston 4/24/20 at [facility]

## 2020-03-27 ENCOUNTER — PREP FOR PROCEDURE (OUTPATIENT)
Dept: GASTROENTEROLOGY | Facility: MEDICAL CENTER | Age: 50
End: 2020-03-27

## 2020-03-27 ENCOUNTER — TELEPHONE (OUTPATIENT)
Dept: GASTROENTEROLOGY | Facility: MEDICAL CENTER | Age: 50
End: 2020-03-27

## 2020-03-27 DIAGNOSIS — Z12.11 SCREENING FOR COLON CANCER: Primary | ICD-10-CM

## 2020-05-15 ENCOUNTER — HOSPITAL ENCOUNTER (OUTPATIENT)
Dept: MAMMOGRAPHY | Facility: MEDICAL CENTER | Age: 50
Discharge: HOME | End: 2020-05-15
Payer: COMMERCIAL

## 2020-05-15 VITALS — HEIGHT: 62 IN | BODY MASS INDEX: 24.66 KG/M2 | WEIGHT: 134 LBS

## 2020-05-15 DIAGNOSIS — Z12.31 ENCOUNTER FOR SCREENING MAMMOGRAM FOR BREAST CANCER: ICD-10-CM

## 2020-05-15 PROCEDURE — 77067 SCR MAMMO BI INCL CAD: CPT

## 2020-05-15 PROCEDURE — 77063 BREAST TOMOSYNTHESIS BI: CPT

## 2020-06-02 ENCOUNTER — TELEPHONE (OUTPATIENT)
Dept: GASTROENTEROLOGY | Facility: AMBULARY SURGERY CENTER | Age: 50
End: 2020-06-02

## 2020-06-26 ENCOUNTER — OFFICE VISIT (OUTPATIENT)
Dept: FAMILY MEDICINE CLINIC | Facility: CLINIC | Age: 50
End: 2020-06-26
Payer: COMMERCIAL

## 2020-06-26 ENCOUNTER — PREP FOR PROCEDURE (OUTPATIENT)
Dept: GASTROENTEROLOGY | Facility: MEDICAL CENTER | Age: 50
End: 2020-06-26

## 2020-06-26 VITALS
HEART RATE: 74 BPM | DIASTOLIC BLOOD PRESSURE: 80 MMHG | HEIGHT: 62 IN | WEIGHT: 133 LBS | TEMPERATURE: 97.5 F | SYSTOLIC BLOOD PRESSURE: 120 MMHG | BODY MASS INDEX: 24.48 KG/M2 | OXYGEN SATURATION: 98 %

## 2020-06-26 DIAGNOSIS — Z12.11 SCREENING FOR COLON CANCER: Primary | ICD-10-CM

## 2020-06-26 DIAGNOSIS — M77.12 LATERAL EPICONDYLITIS OF LEFT ELBOW: Primary | ICD-10-CM

## 2020-06-26 DIAGNOSIS — Z20.822 ENCOUNTER FOR LABORATORY TESTING FOR COVID-19 VIRUS: ICD-10-CM

## 2020-06-26 PROBLEM — Z12.39 SCREENING FOR MALIGNANT NEOPLASM OF BREAST: Status: RESOLVED | Noted: 2019-02-05 | Resolved: 2020-06-26

## 2020-06-26 PROBLEM — Z01.419 ENCOUNTER FOR GYNECOLOGICAL EXAMINATION (GENERAL) (ROUTINE) WITHOUT ABNORMAL FINDINGS: Status: RESOLVED | Noted: 2019-02-05 | Resolved: 2020-06-26

## 2020-06-26 PROCEDURE — 3008F BODY MASS INDEX DOCD: CPT | Performed by: FAMILY MEDICINE

## 2020-06-26 PROCEDURE — 99212 OFFICE O/P EST SF 10 MIN: CPT | Performed by: FAMILY MEDICINE

## 2020-06-26 PROCEDURE — 1036F TOBACCO NON-USER: CPT | Performed by: FAMILY MEDICINE

## 2020-07-30 ENCOUNTER — TELEPHONE (OUTPATIENT)
Dept: DERMATOLOGY | Facility: CLINIC | Age: 50
End: 2020-07-30

## 2020-08-07 NOTE — TELEPHONE ENCOUNTER
Left message for patient offering an appointment from the wait list 
Return call to patient who wanted to make an appointment to be seen  She was called from the wait list before by Susanna Alonso  Patient was scheduled for 09/29/2020 
Spine appears normal, range of motion is not limited, no muscle or joint tenderness

## 2020-08-14 ENCOUNTER — TELEPHONE (OUTPATIENT)
Dept: GASTROENTEROLOGY | Facility: MEDICAL CENTER | Age: 50
End: 2020-08-14

## 2020-08-14 ENCOUNTER — CONSULT (OUTPATIENT)
Dept: OBGYN CLINIC | Facility: MEDICAL CENTER | Age: 50
End: 2020-08-14
Payer: COMMERCIAL

## 2020-08-14 ENCOUNTER — PREP FOR PROCEDURE (OUTPATIENT)
Dept: GASTROENTEROLOGY | Facility: MEDICAL CENTER | Age: 50
End: 2020-08-14

## 2020-08-14 VITALS
TEMPERATURE: 97.8 F | HEART RATE: 87 BPM | WEIGHT: 138 LBS | SYSTOLIC BLOOD PRESSURE: 124 MMHG | HEIGHT: 62 IN | DIASTOLIC BLOOD PRESSURE: 75 MMHG | BODY MASS INDEX: 25.4 KG/M2

## 2020-08-14 DIAGNOSIS — M77.12 LATERAL EPICONDYLITIS OF LEFT ELBOW: ICD-10-CM

## 2020-08-14 DIAGNOSIS — Z12.11 SCREENING FOR COLON CANCER: Primary | ICD-10-CM

## 2020-08-14 PROCEDURE — 3008F BODY MASS INDEX DOCD: CPT | Performed by: ORTHOPAEDIC SURGERY

## 2020-08-14 PROCEDURE — 1036F TOBACCO NON-USER: CPT | Performed by: ORTHOPAEDIC SURGERY

## 2020-08-14 PROCEDURE — 99244 OFF/OP CNSLTJ NEW/EST MOD 40: CPT | Performed by: ORTHOPAEDIC SURGERY

## 2020-08-14 NOTE — LETTER
August 14, 2020     Veena Carias PA-C  [address]  [address]    Patient: Isiah Varner   YOB: 1970   Date of Visit: 8/14/2020       Dear Dr Felice Zheng: Thank you for referring Gerri Jacob to me for evaluation  Below are my notes for this consultation  If you have questions, please do not hesitate to call me  I look forward to following your patient along with you  Sincerely,        Kera Naylor MD        CC: DO Kera Malone MD  8/14/2020  9:17 AM  Sign when Signing Visit  Chief Complaint     Left elbow pain      History of Present Illness     Isiah Varner is a 48 y o  female who is right-hand-dominant and 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  I am being asked to see her in consultation at the request of Bradley Lawson  Patient notes about a 3 year history of lateral-sided left elbow pain  She notes that has been worsening since February but recently since she has been out of work it has gotten a bit better  Denies any trauma  Has pain is with significant gripping or pulling  She has no popping or clicking  Denies numbness or tingling  Also notes a slight decrease in  strength  Has tried a counterforce brace which she purchased on her own which she says helps a bit  Has not taken any anti-inflammatories and has not received any corticosteroid injections  Has not done any therapy  History reviewed  No pertinent past medical history      Past Surgical History:   Procedure Laterality Date    CERVICAL CONE BIOPSY         No Known Allergies    Current Outpatient Medications on File Prior to Visit   Medication Sig Dispense Refill    Calcium 500 MG tablet Take 1 tablet by mouth 2 (two) times a day      DAILY MULTIPLE VITAMINS/IRON TABS Take 1 tablet by mouth daily      JUNEL FE 24 1-20 MG-MCG(24) per tablet TAKE 1 TABLET DAILY AS DIRECTED 84 tablet 4     No current facility-administered medications on file prior to visit  Social History     Tobacco Use    Smoking status: Never Smoker    Smokeless tobacco: Never Used   Substance Use Topics    Alcohol use: Yes     Alcohol/week: 7 0 standard drinks     Types: 7 Glasses of wine per week     Comment: 1-2 glasses of wine a night - daily    Drug use: Never       Family History   Problem Relation Age of Onset    Diabetes Father     Coronary artery disease Father         4 stents    Lymphoma Father         non-Hodgkin's    Squamous cell carcinoma Mother     Cervical cancer Sister     Other Brother         pituitary tumor    No Known Problems Maternal Grandmother     No Known Problems Maternal Grandfather     No Known Problems Paternal Grandmother     No Known Problems Paternal Grandfather     No Known Problems Daughter     No Known Problems Paternal Aunt     No Known Problems Paternal Aunt        Review of Systems     As stated in the HPI  All other systems were reviewed and are negative  Physical Exam     /75   Pulse 87   Temp 97 8 °F (36 6 °C)   Ht 5' 2" (1 575 m)   Wt 62 6 kg (138 lb)   LMP 11/25/2017   BMI 25 24 kg/m²     GENERAL: This is a well-developed, well-nourished, age-appropriate patient in no acute distress  The patient is alert and oriented x3  Pleasant and cooperative  Eyes: Anicteric sclerae  Extraocular movements appear intact  HENT: Nares are patent with no drainage  Lungs: There is equal chest rise on inspection  Breathing is non-labored with no audible wheezing  Cardiovascular: No cyanosis  No upper extremity lymphadema  Skin: Skin is warm to touch  No obvious skin lesions or rashes other than described below  Neurologic: No ataxia  Psychiatric: Mood and affect are appropriate  Examination left upper extremity reveals full range of motion about the shoulder elbow forearm wrist and fingers with [facility] 0 5/5 Motor to the APB, FDI, FDP2, FDP5, EDC   Sensation intact to light touch in the median, radial, and ulnar nerve distribution  No crepitance with elbow range of motion  Tenderness to palpation maximally at the lateral epicondylar ridge and also into the radial tunnel worse compared to the contralateral side  Minimal tenderness at the radiocapitellar joint  Positive significant pain with resisted wrist extension with the elbow straight compared to elbow flexed  Brisk capillary refill to all digits       Data Review     Results Reviewed     None             Imaging:  None today    Assessment and Plan      Diagnoses and all orders for this visit:    Lateral epicondylitis of left elbow  -     Ambulatory referral to Hand Surgery  -     Ambulatory referral to PT/OT hand therapy; Future             80-year-old female with left lateral epicondylitis and a bit of radial tunnel syndrome likely resultant from her counterforce brace  We transitioned her out of the counterforce brace and begin wrist bracing, discussed  ergonomics and she will try this as well as starting therapy  We will see her back in 8 weeks  We discussed the natural course of this disease and how it is likely to get better on its own over time, though this may take up to 1-2 years  I have discussed that there are other management opportunities including therapy and wrist bracing that may expedite this process  Wrist bracing during activities, education on  ergonomics, specifically with an avoidance of palm down gripping, and hand therapy for stretching more than strengthening of the forearm musculature will be beneficial   Typically, there is improvement with these modalities in the 1st 6 weeks after initiation of treatment  We have discussed that the role of corticosteroid injection has changed with recent research showing that it may exacerbate symptoms in the long term, and so I only reserve injections for those who are refractory to any treatment for about a 3 month period or more    Surgical intervention utilizing open or arthroscopic procedures is reserved for the select few who do not get any better over a long period of time           Follow Up:  8 weeks    To Do Next Visit:     PROCEDURES PERFORMED:  Procedures  No Procedures performed today

## 2020-08-14 NOTE — TELEPHONE ENCOUNTER
Pt Is scheduled for an oa colon with dr An Mae at [facility] on 10/9/20, pt has miralax instructions from cancelled colons  Patient is aware she will need a  to and from   She will get a call the day before with an exact time for arrival

## 2020-08-14 NOTE — PROGRESS NOTES
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  I am being asked to see her in consultation at the request of Dajuan Dixon  Patient notes about a 3 year history of lateral-sided left elbow pain  She notes that has been worsening since February but recently since she has been out of work it has gotten a bit better  Denies any trauma  Has pain is with significant gripping or pulling  She has no popping or clicking  Denies numbness or tingling  Also notes a slight decrease in  strength  Has tried a counterforce brace which she purchased on her own which she says helps a bit  Has not taken any anti-inflammatories and has not received any corticosteroid injections  Has not done any therapy  History reviewed  No pertinent past medical history  Past Surgical History:   Procedure Laterality Date    CERVICAL CONE BIOPSY         No Known Allergies    Current Outpatient Medications on File Prior to Visit   Medication Sig Dispense Refill    Calcium 500 MG tablet Take 1 tablet by mouth 2 (two) times a day      DAILY MULTIPLE VITAMINS/IRON TABS Take 1 tablet by mouth daily      JUNEL FE 24 1-20 MG-MCG(24) per tablet TAKE 1 TABLET DAILY AS DIRECTED 84 tablet 4     No current facility-administered medications on file prior to visit  Social History     Tobacco Use    Smoking status: Never Smoker    Smokeless tobacco: Never Used   Substance Use Topics    Alcohol use:  Yes     Alcohol/week: 7 0 standard drinks     Types: 7 Glasses of wine per week     Comment: 1-2 glasses of wine a night - daily    Drug use: Never       Family History   Problem Relation Age of Onset    Diabetes Father     Coronary artery disease Father         4 stents    Lymphoma Father         non-Hodgkin's    Squamous cell carcinoma Mother     Cervical cancer Sister  Other Brother         pituitary tumor    No Known Problems Maternal Grandmother     No Known Problems Maternal Grandfather     No Known Problems Paternal Grandmother     No Known Problems Paternal Grandfather     No Known Problems Daughter     No Known Problems Paternal Aunt     No Known Problems Paternal Aunt        Review of Systems     As stated in the HPI  All other systems were reviewed and are negative  Physical Exam     /75   Pulse 87   Temp 97 8 °F (36 6 °C)   Ht 5' 2" (1 575 m)   Wt 62 6 kg (138 lb)   LMP 11/25/2017   BMI 25 24 kg/m²     GENERAL: This is a well-developed, well-nourished, age-appropriate patient in no acute distress  The patient is alert and oriented x3  Pleasant and cooperative  Eyes: Anicteric sclerae  Extraocular movements appear intact  HENT: Nares are patent with no drainage  Lungs: There is equal chest rise on inspection  Breathing is non-labored with no audible wheezing  Cardiovascular: No cyanosis  No upper extremity lymphadema  Skin: Skin is warm to touch  No obvious skin lesions or rashes other than described below  Neurologic: No ataxia  Psychiatric: Mood and affect are appropriate  Examination left upper extremity reveals full range of motion about the shoulder elbow forearm wrist and fingers [place] [id number] 5/5 Motor to the APB, FDI, FDP2, FDP5, EDC  Sensation intact to light touch in the median, radial, and ulnar nerve distribution  No crepitance with elbow range of motion  Tenderness to palpation maximally at the lateral epicondylar ridge and also into the radial tunnel worse compared to the contralateral side  Minimal tenderness at the radiocapitellar joint  Positive significant pain with resisted wrist extension with the elbow straight compared to elbow flexed    Brisk capillary refill to all digits       Data Review     Results Reviewed     None             Imaging:  None today    Assessment and Plan      Diagnoses and all orders for this visit:    Lateral epicondylitis of left elbow  -     Ambulatory referral to Hand Surgery  -     Ambulatory referral to PT/OT hand therapy; Future             51-year-old female with left lateral epicondylitis and a bit of radial tunnel syndrome likely resultant from her counterforce brace  We transitioned her out of the counterforce brace and begin wrist bracing, discussed  ergonomics and she will try this as well as starting therapy  We will see her back in 8 weeks  We discussed the natural course of this disease and how it is likely to get better on its own over time, though this may take up to 1-2 years  I have discussed that there are other management opportunities including therapy and wrist bracing that may expedite this process  Wrist bracing during activities, education on  ergonomics, specifically with an avoidance of palm down gripping, and hand therapy for stretching more than strengthening of the forearm musculature will be beneficial   Typically, there is improvement with these modalities in the 1st 6 weeks after initiation of treatment  We have discussed that the role of corticosteroid injection has changed with recent research showing that it may exacerbate symptoms in the long term, and so I only reserve injections for those who are refractory to any treatment for about a 3 month period or more  Surgical intervention utilizing open or arthroscopic procedures is reserved for the select few who do not get any better over a long period of time           Follow Up:  8 weeks    To Do Next Visit:     PROCEDURES PERFORMED:  Procedures  No Procedures performed today

## 2020-08-25 ENCOUNTER — EVALUATION (OUTPATIENT)
Dept: PHYSICAL THERAPY | Facility: MEDICAL CENTER | Age: 50
End: 2020-08-25
Payer: COMMERCIAL

## 2020-08-25 DIAGNOSIS — M77.12 LATERAL EPICONDYLITIS OF LEFT ELBOW: Primary | ICD-10-CM

## 2020-08-25 PROCEDURE — 97140 MANUAL THERAPY 1/> REGIONS: CPT

## 2020-08-25 PROCEDURE — 97161 PT EVAL LOW COMPLEX 20 MIN: CPT

## 2020-08-25 NOTE — PROGRESS NOTES
PT Evaluation     Today's date: 2020  Patient name: Isiah Varner  : 1970  MRN: [id number]  Referring provider: Keisha Perdomo MD  Dx:   Encounter Diagnosis     ICD-10-CM    1  Lateral epicondylitis of left elbow  M77 12 Ambulatory referral to PT/OT hand therapy                  Assessment  Assessment details: Pt is a 48year old RHD female who presents to PT with chronic L lateral epicondylitis  She presents with localized tenderness to proximal common extensors, mild to lateral epicondyle  Pt also has palpable tension to ECRL/B no restriction with ROM  Pt has positive Cozen's, Mill's, and resisted MF ext, yet her wrist stabilizer strength is WNL  She has weakness in her  and pinch strength  Pt also presents with weakness to her RC and scapular musculature which could be contributing to possible compensation at the elbow 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  She should benefit from skilled PT to help reduce symptoms, promote healing of tissues, improve shoulder and elbow strength, and better her overall functioning   Thank you kindly for your referral    Impairments: activity intolerance, impaired physical strength, lacks appropriate home exercise program and pain with function  Understanding of Dx/Px/POC: good   Prognosis: good    Goals  STG (2-3 weeks)  1: Reduce pain 2-3 grades on VAS  2: Pt independent in HEP    LTG (4-6 weeks)  1: Improve FOTO 10 pts  2: pt able to reach without pain  3: Improve RC and scapular strength 1/2 grade  4: Pt able to grasp objects without pain  5: improve  strength 10-15 lbs    Plan  Plan details: Initiate PT as per POC  Patient would benefit from: skilled physical therapy  Planned modality interventions: thermotherapy: hydrocollator packs and cryotherapy  Planned therapy interventions: manual therapy, massage, activity modification, patient education, postural training, flexibility, strengthening, stretching, therapeutic activities, therapeutic exercise, home exercise program and functional ROM exercises  Frequency: 2x week  Duration in visits: 12  Duration in weeks: 6  Plan of Care beginning date: 2020  Plan of Care expiration date: 10/6/2020  Treatment plan discussed with: patient        Subjective Evaluation    History of Present Illness  Mechanism of injury: Has been having issues with L elbow for several years  Recent bout has bene occurring since Feb- worse its ever been  No injections  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  Not necessarily having pain at work pain with other activities, reaching, gripping  Some achiness in AM  Denies N/T            Recurrent probem    Quality of life: good    Pain  Current pain ratin  At best pain ratin  At worst pain ratin  Location: L lateral elbow  Quality: throbbing, dull ache and discomfort  Progression: worsening    Social Support    Employment status: working (dental hygienist)  Hand dominance: right    Patient Goals  Patient goals for therapy: decreased pain, increased strength and independence with ADLs/IADLs          Objective     Tenderness     Additional Tenderness Details  ECRL/ECRB- mild tension to common extensors    Strength/Myotome Testing     Left Shoulder     Planes of Motion   Flexion: 4+   Extension: 5   Abduction: 4+   External rotation at 0°: 4+   Internal rotation at 0°: 4     Isolated Muscles   Middle trapezius: 4   Supraspinatus: 4+     Right Shoulder     Planes of Motion   Flexion: 4+   Extension: 5   Abduction: 4+   External rotation at 0°: 4   Internal rotation at 0°: 4+     Isolated Muscles   Middle trapezius: 4   Supraspinatus: 4+     Left Wrist/Hand   Wrist extension: 5  Wrist flexion: 5     (2nd hand position)     Trial 1: 25    Trial 2: 15    Thumb Strength  Key/Lateral Pinch     Trial 1: 13  Palmar/Three-Point Pinch     Trial 1: 6    Right Wrist/Hand      (2nd hand position)     Trial 1: 65    Trial 2: 50    Thumb Strength   Key/Lateral Pinch     Trial 1: 14  Palmar/Three-Point Pinch     Trial 1: 16    Additional Strength Details  Trial 1 elbow flexed  Trial 2 elbow full ext    Tests     Left Elbow   Positive Cozen's and Mill's  Left Wrist/Hand   Positive resisted middle finger       Additional Tests Details            Flowsheet Rows      Most Recent Value   PT/OT G-Codes   Current Score  59   Projected Score  69             Precautions: onset feb 2020  HE: PROM common ext/flexors; SA punches, SL ER< prone ext, abd    Manuals             IASTM common extensors             PROM wrist ext/flex                                       Neuro Re-Ed                                                                                                        Ther Ex             SA punches             SL ER             Prone sh ext             prone sh abd                          TB row, ext                                       Ther Activity                                       Gait Training                                       Modalities             MH             CP

## 2020-09-01 ENCOUNTER — OFFICE VISIT (OUTPATIENT)
Dept: PHYSICAL THERAPY | Facility: MEDICAL CENTER | Age: 50
End: 2020-09-01
Payer: COMMERCIAL

## 2020-09-01 DIAGNOSIS — M77.12 LATERAL EPICONDYLITIS OF LEFT ELBOW: Primary | ICD-10-CM

## 2020-09-01 PROCEDURE — 97010 HOT OR COLD PACKS THERAPY: CPT

## 2020-09-01 PROCEDURE — 97140 MANUAL THERAPY 1/> REGIONS: CPT

## 2020-09-01 NOTE — PROGRESS NOTES
Daily Note/Discharge     Today's date: 2020  Patient name: Abiola Painting  : 1970  MRN: [id number]  Referring provider: Brennan Melo MD  Dx:   Encounter Diagnosis     ICD-10-CM    1  Lateral epicondylitis of left elbow  M77 12                   Subjective: Pt reports she cancelled her remaining appts  copay will be too burdensome  Has tried the exercises, had some soreness with the stretches but they eventually became more tolerable  Objective: See treatment diary below      Assessment: Tolerated treatment well  Patient exhibited good technique with therapeutic exercises and would benefit from continued PT  Reviewed program and ergonomics with Pt  Added wrist eccentrics with 1# weight  Provided Pt with k-tape that she can use on her own- demonstrated proper design, application and where she can find on her own at drugstore  Pt presented today with only mild tension to her common extensors, no subdermal adhesions with IASTM       Plan: D/C at this time to HEP     Precautions: onset 2020  HE: PROM common ext/flexors; SA punches, SL ER< prone ext, abd    Manuals             IASTM common extensors 10            PROM wrist ext/flex 5             k-tape 5             20            Neuro Re-Ed                                                                                                        Ther Ex             SA punches             SL ER             Prone sh ext             prone sh abd                          TB row, ext                          Wrist eccentrics 20x 1#            Ther Activity                                       Gait Training                                       Modalities             MH 10 min            CP

## 2020-09-04 ENCOUNTER — APPOINTMENT (OUTPATIENT)
Dept: PHYSICAL THERAPY | Facility: MEDICAL CENTER | Age: 50
End: 2020-09-04
Payer: COMMERCIAL

## 2020-09-09 ENCOUNTER — APPOINTMENT (OUTPATIENT)
Dept: PHYSICAL THERAPY | Facility: MEDICAL CENTER | Age: 50
End: 2020-09-09
Payer: COMMERCIAL

## 2020-09-11 ENCOUNTER — APPOINTMENT (OUTPATIENT)
Dept: PHYSICAL THERAPY | Facility: MEDICAL CENTER | Age: 50
End: 2020-09-11
Payer: COMMERCIAL

## 2020-09-15 ENCOUNTER — APPOINTMENT (OUTPATIENT)
Dept: PHYSICAL THERAPY | Facility: MEDICAL CENTER | Age: 50
End: 2020-09-15
Payer: COMMERCIAL

## 2020-09-18 ENCOUNTER — APPOINTMENT (OUTPATIENT)
Dept: PHYSICAL THERAPY | Facility: MEDICAL CENTER | Age: 50
End: 2020-09-18
Payer: COMMERCIAL

## 2020-09-22 ENCOUNTER — APPOINTMENT (OUTPATIENT)
Dept: PHYSICAL THERAPY | Facility: MEDICAL CENTER | Age: 50
End: 2020-09-22
Payer: COMMERCIAL

## 2020-09-25 ENCOUNTER — APPOINTMENT (OUTPATIENT)
Dept: PHYSICAL THERAPY | Facility: MEDICAL CENTER | Age: 50
End: 2020-09-25
Payer: COMMERCIAL

## 2020-09-29 ENCOUNTER — APPOINTMENT (OUTPATIENT)
Dept: PHYSICAL THERAPY | Facility: MEDICAL CENTER | Age: 50
End: 2020-09-29
Payer: COMMERCIAL

## 2020-09-29 ENCOUNTER — TELEMEDICINE (OUTPATIENT)
Dept: FAMILY MEDICINE CLINIC | Facility: CLINIC | Age: 50
End: 2020-09-29
Payer: COMMERCIAL

## 2020-09-29 DIAGNOSIS — Z20.828 EXPOSURE TO SARS-ASSOCIATED CORONAVIRUS: ICD-10-CM

## 2020-09-29 DIAGNOSIS — Z20.828 EXPOSURE TO SARS-ASSOCIATED CORONAVIRUS: Primary | ICD-10-CM

## 2020-09-29 PROCEDURE — U0003 INFECTIOUS AGENT DETECTION BY NUCLEIC ACID (DNA OR RNA); SEVERE ACUTE RESPIRATORY SYNDROME CORONAVIRUS 2 (SARS-COV-2) (CORONAVIRUS DISEASE [COVID-19]), AMPLIFIED PROBE TECHNIQUE, MAKING USE OF HIGH THROUGHPUT TECHNOLOGIES AS DESCRIBED BY CMS-2020-01-R: HCPCS | Performed by: INTERNAL MEDICINE

## 2020-09-29 PROCEDURE — 99213 OFFICE O/P EST LOW 20 MIN: CPT | Performed by: INTERNAL MEDICINE

## 2020-09-29 NOTE — PROGRESS NOTES
COVID-19 Virtual Visit     Assessment/Plan:    Problem List Items Addressed This Visit     None      Patient's  was diagnosed with COVID-19 infection  They live in 1 household  She will be tested for COVID-19 as well, she was recommended to be quarantined for 2 weeks  This virtual check-in was done via [employer]  Disposition:      I referred Bianca Arias to one of our centralized sites for a COVID-19 swab    I spent 15 minutes directly with the patient during this visit    Encounter provider Ayden Richardson MD    Provider located at [address] [place] [id number]    Recent Visits  No visits were found meeting these conditions  Showing recent visits within past 7 days and meeting all other requirements     Today's Visits  Date Type Provider Dept   09/29/20 Alysha Hu MD [facility]   Showing today's visits and meeting all other requirements     Future Appointments  No visits were found meeting these conditions  Showing future appointments within next 150 days and meeting all other requirements        Patient agrees to participate in a virtual check in via telephone or video visit instead of presenting to the office to address urgent/immediate medical needs  Patient is aware this is a billable service  After connecting through [employer], the patient was identified by name and date of birth  Regino Burroughs was informed that this was a telemedicine visit and that the exam was being conducted confidentially over secure lines  My office door was closed  No one else was in the room  Regino Burroughs acknowledged consent and understanding of privacy and security of the telemedicine visit  I informed the patient that I have reviewed her record in Epic and presented the opportunity for her to ask any questions regarding the visit today  The patient agreed to participate        Shelia BOGGS Kira Barriga is a 48 y o  female who is concerned about COVID-19  She reports no symptoms, requires screening  She has not experienced no symptoms, requires screening She has had contact with a person who is under investigation for or who is positive for COVID-19 within the last 14 days  She has not been hospitalized recently for fever and/or lower respiratory symptoms  No past medical history on file  Past Surgical History:   Procedure Laterality Date    CERVICAL CONE BIOPSY         Current Outpatient Medications   Medication Sig Dispense Refill    Calcium 500 MG tablet Take 1 tablet by mouth 2 (two) times a day      DAILY MULTIPLE VITAMINS/IRON TABS Take 1 tablet by mouth daily      JUNEL FE 24 1-20 MG-MCG(24) per tablet TAKE 1 TABLET DAILY AS DIRECTED 84 tablet 4     No current facility-administered medications for this visit  No Known Allergies    Review of Systems    Video Exam    There were no vitals filed for this visit  Adelita appears healthy  Physical Exam     VIRTUAL VISIT DISCLAIMER    Kye Pelaez acknowledges that she has consented to an online visit or consultation  She understands that the online visit is based solely on information provided by her, and that, in the absence of a face-to-face physical evaluation by the physician, the diagnosis she receives is both limited and provisional in terms of accuracy and completeness  This is not intended to replace a full medical face-to-face evaluation by the physician  Kye Pelaez understands and accepts these terms

## 2020-09-30 LAB — SARS-COV-2 RNA SPEC QL NAA+PROBE: DETECTED

## 2020-10-02 ENCOUNTER — TELEMEDICINE (OUTPATIENT)
Dept: FAMILY MEDICINE CLINIC | Facility: CLINIC | Age: 50
End: 2020-10-02
Payer: COMMERCIAL

## 2020-10-02 DIAGNOSIS — U07.1 COVID-19 VIRUS DETECTED: Primary | ICD-10-CM

## 2020-10-02 PROCEDURE — 99213 OFFICE O/P EST LOW 20 MIN: CPT | Performed by: INTERNAL MEDICINE

## 2020-10-22 ENCOUNTER — TELEPHONE (OUTPATIENT)
Dept: DERMATOLOGY | Facility: CLINIC | Age: 50
End: 2020-10-22

## 2020-11-01 ENCOUNTER — TELEPHONE (OUTPATIENT)
Dept: OTHER | Facility: OTHER | Age: 50
End: 2020-11-01

## 2020-11-27 ENCOUNTER — TELEPHONE (OUTPATIENT)
Dept: FAMILY MEDICINE CLINIC | Facility: CLINIC | Age: 50
End: 2020-11-27

## 2020-12-02 ENCOUNTER — TELEPHONE (OUTPATIENT)
Dept: GASTROENTEROLOGY | Facility: CLINIC | Age: 50
End: 2020-12-02

## 2020-12-30 ENCOUNTER — OFFICE VISIT (OUTPATIENT)
Dept: OBGYN CLINIC | Facility: MEDICAL CENTER | Age: 50
End: 2020-12-30
Payer: COMMERCIAL

## 2020-12-30 VITALS
BODY MASS INDEX: 25.06 KG/M2 | DIASTOLIC BLOOD PRESSURE: 73 MMHG | HEART RATE: 85 BPM | WEIGHT: 136.2 LBS | SYSTOLIC BLOOD PRESSURE: 119 MMHG | HEIGHT: 62 IN

## 2020-12-30 DIAGNOSIS — M77.12 LATERAL EPICONDYLITIS OF LEFT ELBOW: Primary | ICD-10-CM

## 2020-12-30 PROCEDURE — 99213 OFFICE O/P EST LOW 20 MIN: CPT | Performed by: ORTHOPAEDIC SURGERY

## 2020-12-30 NOTE — PROGRESS NOTES
Chief Complaint     Left elbow pain       History of Present Illness     Nick Fuchs is a 48 y o  female who presents to the office today for follow up evaluation left elbow lateral epicondylitis  Patient did attended a few sessions of OT and discontinued due to cost  She has been compliant with HEP  Patient states over the past week she feels as though she is beginning to make progress in regards to pain  She has recently started taking collagen gummies  She has not been taking anything OTC for pain  She has been using the cock-up wrist brace while at work  She denies any numbness or tingling  History reviewed  No pertinent past medical history  Past Surgical History:   Procedure Laterality Date    CERVICAL CONE BIOPSY         No Known Allergies    Current Outpatient Medications on File Prior to Visit   Medication Sig Dispense Refill    Calcium 500 MG tablet Take 1 tablet by mouth 2 (two) times a day      COLLAGEN-VITAMIN C PO Take by mouth daily      DAILY MULTIPLE VITAMINS/IRON TABS Take 1 tablet by mouth daily      VITAMIN D PO Take 5,000 Units by mouth daily      JUNEL FE 24 1-20 MG-MCG(24) per tablet TAKE 1 TABLET DAILY AS DIRECTED 84 tablet 4     No current facility-administered medications on file prior to visit  Social History     Tobacco Use    Smoking status: Never Smoker    Smokeless tobacco: Never Used   Substance Use Topics    Alcohol use:  Yes     Alcohol/week: 7 0 standard drinks     Types: 7 Glasses of wine per week     Comment: 1-2 glasses of wine a night - daily    Drug use: Never       Family History   Problem Relation Age of Onset    Diabetes Father     Coronary artery disease Father         4 stents    Lymphoma Father         non-Hodgkin's    Squamous cell carcinoma Mother     Cervical cancer Sister     Other Brother         pituitary tumor    No Known Problems Maternal Grandmother     No Known Problems Maternal Grandfather     No Known Problems Paternal Grandmother     No Known Problems Paternal Grandfather     No Known Problems Daughter     No Known Problems Paternal Aunt     No Known Problems Paternal Aunt        Review of Systems     As stated in the HPI  All other systems were reviewed and are negative  Physical Exam     /73   Pulse 85   Ht 5' 2" (1 575 m)   Wt 61 8 kg (136 lb 3 2 oz)   LMP 11/25/2017   BMI 24 91 kg/m²     GENERAL: This is a well-developed, well-nourished, age-appropriate patient in no acute distress  The patient is alert and oriented x3  Pleasant and cooperative  Eyes: Anicteric sclerae  Extraocular movements appear intact  HENT: Nares are patent with no drainage  Lungs: There is equal chest rise on inspection  Breathing is non-labored with no audible wheezing  Cardiovascular: No cyanosis  No upper extremity lymphadema  Skin: Skin is warm to touch  No obvious skin lesions or rashes other than described below  Neurologic: No ataxia  Psychiatric: Mood and affect are appropriate  Left elbow:  Full elbow ROM  Mild pain with resisted wrist extension worse with elbow straight than compared to flex   Mild TTP lateral epicondyle  5/5 Motor to the APB, FDI, FDP2, FDP5, EDC  Sensation intact to light touch in the median, radial, and ulnar nerve distribution  Brisk capillary refill to all digits     Data Review     Results Reviewed     None             Imaging:  No new images reviewed     Assessment and Plan      Diagnoses and all orders for this visit:    Lateral epicondylitis of left elbow    Other orders  -     COLLAGEN-VITAMIN C PO; Take by mouth daily  -     VITAMIN D PO; Take 5,000 Units by mouth daily           59-year-old female who presents to the office for follow up evaluation left elbow lateral epicondylitis  Patient has been doing well and making good progress  She is less tender on exam  She will continue with HEP  Patient will continue with the cock-up wrist brace for repetitive activities   She will follow up in 6-8 weeks for repeat evaluation  If she is doing well at that time she may cancel and follow up as needed         Follow Up: 6-8 weeks     To Do Next Visit: re-evaluate     PROCEDURES PERFORMED:  Procedures   No Procedures performed today     Scribe Attestation    I,:  Lakeshia Lane MA am acting as a scribe while in the presence of the attending physician :       I,:  Ryan Hrust MD personally performed the services described in this documentation    as scribed in my presence :

## 2020-12-31 ENCOUNTER — PREP FOR PROCEDURE (OUTPATIENT)
Dept: GASTROENTEROLOGY | Facility: CLINIC | Age: 50
End: 2020-12-31

## 2020-12-31 DIAGNOSIS — Z12.11 COLON CANCER SCREENING: Primary | ICD-10-CM

## 2021-01-12 ENCOUNTER — ANESTHESIA EVENT (OUTPATIENT)
Dept: GASTROENTEROLOGY | Facility: MEDICAL CENTER | Age: 51
End: 2021-01-12

## 2021-01-20 ENCOUNTER — CONSULT (OUTPATIENT)
Dept: DERMATOLOGY | Facility: CLINIC | Age: 51
End: 2021-01-20
Payer: COMMERCIAL

## 2021-01-20 VITALS — HEIGHT: 62 IN | BODY MASS INDEX: 24.95 KG/M2 | WEIGHT: 135.6 LBS | TEMPERATURE: 97.8 F

## 2021-01-20 DIAGNOSIS — D22.70 MULTIPLE BENIGN MELANOCYTIC NEVI OF UPPER AND LOWER EXTREMITIES AND TRUNK: ICD-10-CM

## 2021-01-20 DIAGNOSIS — D22.5 MULTIPLE BENIGN MELANOCYTIC NEVI OF UPPER AND LOWER EXTREMITIES AND TRUNK: ICD-10-CM

## 2021-01-20 DIAGNOSIS — L81.4 SOLAR LENTIGO: ICD-10-CM

## 2021-01-20 DIAGNOSIS — D22.60 MULTIPLE BENIGN MELANOCYTIC NEVI OF UPPER AND LOWER EXTREMITIES AND TRUNK: ICD-10-CM

## 2021-01-20 DIAGNOSIS — L82.1 SEBORRHEIC KERATOSIS: Primary | ICD-10-CM

## 2021-01-20 PROCEDURE — 99204 OFFICE O/P NEW MOD 45 MIN: CPT | Performed by: DERMATOLOGY

## 2021-01-20 NOTE — PATIENT INSTRUCTIONS
Use thick rich hand cream multiple times a day  Avoid heavily perfumed lotions and creams  MULTIPLE MELANOCYTIC NEVI ("Moles")      Assessment and Plan:  Based on a thorough discussion of this condition and the management approach to it (including a comprehensive discussion of the known risks, side effects and potential benefits of treatment), the patient (family) agrees to implement the following specific plan:  · Reassure benign  · Monitor for changes  · Use sun protection  Apply SPF 30 or higher at least three times a day  Wear sun protecting clothing and hats

## 2021-01-20 NOTE — PROGRESS NOTES
[address] [facility] Note     Patient Name: Andree Hammer  Encounter Date: 1/20/2021     Have you been cared for by a [facility] Dermatologist in the last 3 years and, if so, which one? No    · Have you traveled outside of the [address] [place] [address] in the past 3 months or outside of the [facility] area in the last 2 weeks? No     May we call your Preferred Phone number to discuss your specific medical information? Yes     May we leave a detailed message that includes your specific medical information? Yes      Today's Chief Concerns:   Concern #1:  Skin Check      Past Medical History:  Have you personally ever had or currently have any of the following? · Skin cancer (such as Melanoma, Basal Cell Carcinoma, Squamous Cell Carcinoma? (If Yes, please provide more detail)- No  · Eczema: No  · Psoriasis: No  · HIV/AIDS: No  · Hepatitis B or C: No  · Tuberculosis: No  · Systemic Immunosuppression such as Diabetes, Biologic or Immunotherapy, Chemotherapy, Organ Transplantation, Bone Marrow Transplantation (If YES, please provide more detail): No  · Radiation Treatment (If YES, please provide more detail): No  · Any other major medical conditions/concerns? 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 Walk    Family History:  Have any of your "first degree relatives" (parent, brother, sister, or child) had any of the following       · Skin cancer such as Melanoma or Merkel Cell Carcinoma or Pancreatic Cancer? No  · Eczema, Asthma, Hay Fever or Seasonal Allergies: No  · Psoriasis or Psoriatic Arthritis: No  · Do any other medical conditions seem to run in your family? If Yes, what condition and which relatives?   No    Current Medications:   (please update all dermatological medications before printing patient's AVS!)      Current Outpatient Medications:     Calcium 500 MG tablet, Take 1 tablet by mouth 2 (two) times a day, Disp: , Rfl:     COLLAGEN-VITAMIN C PO, Take by mouth daily, Disp: , Rfl:     DAILY MULTIPLE VITAMINS/IRON TABS, Take 1 tablet by mouth daily, Disp: , Rfl:     JUNEL FE 24 1-20 MG-MCG(24) per tablet, TAKE 1 TABLET DAILY AS DIRECTED, Disp: 84 tablet, Rfl: 4    VITAMIN D PO, Take 5,000 Units by mouth daily, Disp: , Rfl:       Review of Systems:  Have you recently had or currently have any of the following? If YES, what are you doing for the problem? · Fever, chills or unintended weight loss: No  · Sudden loss or change in your vision: No  · Nausea, vomiting or blood in your stool: No  · Painful or swollen joints: No  · Wheezing or cough: No  · Changing mole or non-healing wound: No  · Nosebleeds: No  · Excessive sweating: No  · Easy or prolonged bleeding? No  · Over the last 2 weeks, how often have you been bothered by the following problems? · Taking little interest or pleasure in doing things: 1 - Not at All  · Feeling down, depressed, or hopeless: 1 - Not at All  · Rapid heartbeat with epinephrine:  No    · FEMALES ONLY:    · Are you pregnant or planning to become pregnant? No  · Are you currently or planning to be nursing or breast feeding? No    · Any known allergies? No Known Allergies      Physical Exam:     Was a chaperone (Derm Clinical Assistant) present throughout the entire Physical Exam? Yes     Did the Dermatology Team specifically  the patient on the importance of a Full Skin Exam to be sure that nothing is missed clinically? Yes}  o Did the patient ultimately request or accept a Full Skin Exam?  Yes  o Did the patient specifically refuse to have the areas "under-the-bra" examined by the Dermatologist? No  o Did the patient specifically refuse to have the areas "under-the-underwear" examined by the Dermatologist? No    CONSTITUTIONAL:   There were no vitals filed for this visit      Today's Height:   5'2"  Today's Weight (in kilograms): 61 508 kg  Today's Temperature:   97 8    PSYCH: Normal mood and affect  EYES: Normal conjunctiva  ENT: Normal lips and oral mucosa  CARDIOVASCULAR: No edema  RESPIRATORY: Normal respirations  HEME/LYMPH/IMMUNO:  No regional lymphadenopathy except as noted below in "ASSESSMENT AND PLAN BY DIAGNOSIS"    SKIN:  FULL ORGAN SYSTEM EXAM   Hair, Scalp, Ears, Face Normal except as noted below in Assessment   Neck, Cervical Chain Nodes Normal except as noted below in Assessment   Right Arm/Hand/Fingers Normal except as noted below in Assessment   Left Arm/Hand/Fingers Normal except as noted below in Assessment   Chest/Breasts/Axillae Viewed areas Normal except as noted below in Assessment   Abdomen, Umbilicus Normal except as noted below in Assessment   Back/Spine Normal except as noted below in Assessment   Buttocks Normal except as noted below in Assessment   Right Leg, Foot, Toes Normal except as noted below in Assessment   Left Leg, Foot, Toes Normal except as noted below in Assessment        Assessment and Plan by Diagnosis:    History of Present Condition:     Duration:  How long has this been an issue for you?    o  Years   Location Affected:  Where on the body is this affecting you?    o  Back   Quality:  Is there any bleeding, pain, itch, burning/irritation, or redness associated with the skin lesion?    o  Denies   Severity:  Describe any bleeding, pain, itch, burning/irritation, or redness on a scale of 1 to 10 (with 10 being the worst)  o  Denies   Timing:  Does this condition seem to be there pretty constantly or do you notice it more at specific times throughout the day?    o  Constant   Context:  Have you ever noticed that this condition seems to be associated with specific activities you do?    o  Denies   Modifying Factors:    o Anything that seems to make the condition worse?    -  Denies  o What have you tried to do to make the condition better?     -  Denies   Associated Signs and Symptoms:  Does this skin lesion seem to be associated with any of the following:                SEBORRHEIC KERATOSIS; NON-INFLAMED     Physical Exam:  · Anatomic Location Affected:  Trunk and extremities  · Morphological Description:  Waxy, smooth to warty textured, yellow to brownish-grey to dark brown to blackish, discrete, "stuck-on" appearing papules  · Present for years  Denies pain, itch, bleeding  Additional History of Present Condition:  Present constantly; no modifying factors which make it worse or better  No prior treatment  Assessment and Plan:  Based on a thorough discussion of this condition and the management approach to it (including a comprehensive discussion of the known risks, side effects and potential benefits of treatment), the patient (family) agrees to implement the following specific plan:  · Reassure benign  · Use sun protection  Apply SPF 30 or higher at least three times a day  Wear sun protecting clothing and hats  SOLAR LENTIGINES      Physical Exam:   Anatomic Location Affected:  Sun exposed areas of back, chest, arms, legs   Morphological Description:  Multiple scattered brown to tan evenly pigmented macules    Denies pain, itch, bleeding  No treatments tried  Present for months - years  Reports getting newer lesions with sun exposure  Assessment and Plan:  Based on a thorough discussion of this condition and the management approach to it (including a comprehensive discussion of the known risks, side effects and potential benefits of treatment), the patient (family) agrees to implement the following specific plan:  · Reassure benign  · Use sun protection  Apply SPF 30 or higher at least three times a day  Wear sun protecting clothing and hats           MULTIPLE MELANOCYTIC NEVI ("Moles")     Physical Exam:  · Anatomic Location Affected: Trunk and extremities  · Morphological Description:  Scattered, round to ovoid, symmetrical-appearing, even bordered, skin colored to dark brown macules/papules  · Denies pain, itch, bleeding  No treatments tried  Present for years  Present constantly; no modifying factors which make it worse or better  Denies actively changing or growing moles  Assessment and Plan:  Based on a thorough discussion of this condition and the management approach to it (including a comprehensive discussion of the known risks, side effects and potential benefits of treatment), the patient (family) agrees to implement the following specific plan:  · Reassure benign  · Monitor for changes  · Use sun protection  Apply SPF 30 or higher at least three times a day  Wear sun protecting clothing and hats  Worrisome signs of skin malignancy discussed, questions answered  Regular self-skin check discussed  Advised to call or return to office if patient notices any spots of concern, rapidly growing/changing lesions, bleeding lesions, non-healing lesions  Advised regular SPF use         Scribe Attestation    I,:  Manuel Weaver am acting as a scribe while in the presence of the attending physician :       I,:  Jayda Blood MD personally performed the services described in this documentation    as scribed in my presence :

## 2021-01-26 ENCOUNTER — OFFICE VISIT (OUTPATIENT)
Dept: FAMILY MEDICINE CLINIC | Facility: CLINIC | Age: 51
End: 2021-01-26
Payer: COMMERCIAL

## 2021-01-26 VITALS
BODY MASS INDEX: 25.28 KG/M2 | RESPIRATION RATE: 18 BRPM | SYSTOLIC BLOOD PRESSURE: 100 MMHG | OXYGEN SATURATION: 99 % | HEIGHT: 62 IN | DIASTOLIC BLOOD PRESSURE: 64 MMHG | HEART RATE: 75 BPM | WEIGHT: 137.4 LBS

## 2021-01-26 DIAGNOSIS — Z23 NEED FOR INFLUENZA VACCINATION: Primary | ICD-10-CM

## 2021-01-26 DIAGNOSIS — M54.50 ACUTE MIDLINE LOW BACK PAIN WITHOUT SCIATICA: ICD-10-CM

## 2021-01-26 PROCEDURE — 99214 OFFICE O/P EST MOD 30 MIN: CPT | Performed by: FAMILY MEDICINE

## 2021-01-26 PROCEDURE — 3725F SCREEN DEPRESSION PERFORMED: CPT | Performed by: FAMILY MEDICINE

## 2021-01-26 PROCEDURE — 1036F TOBACCO NON-USER: CPT | Performed by: FAMILY MEDICINE

## 2021-01-26 RX ORDER — METAXALONE 800 MG/1
TABLET ORAL
Qty: 25 TABLET | Refills: 0 | Status: SHIPPED | OUTPATIENT
Start: 2021-01-26 | End: 2022-02-04

## 2021-01-26 RX ORDER — VITAMIN B COMPLEX
1 TABLET ORAL DAILY
COMMUNITY
End: 2021-01-29

## 2021-01-26 NOTE — PROGRESS NOTES
Assessment/Plan:   1  Need for influenza vaccination  [facility] at [facility]  2  Acute midline low back pain without sciatica  Suspect musculoskeletal/muscle spasm  Trial of muscle relaxant  Ice or heat  Avoid heavy lifting  Return to work in 2 days  There are no Patient Instructions on file for this visit  Return in about 3 months (around 4/26/2021) for Annual physical   Subjective:    PRINCESS Ge is a 48 y o  female who presents with:  Chief Complaint     Back Pain        HPI     Back Pain      Additional comments: post-lifting chair onto truck          Last edited by Warren Gan on 1/26/2021 10:40 AM  (History)        ---Above per clinical staff & reviewed  ---        Pt developed acute back pain  (midline) X 3 days ago after lifting a chair onto a truck  Had mild pain initially and then went to get something out of the dryer the next am and pain worsened  (noticed when bending down)    No radiation of pain  Pt has been out of work X 2 days due to pain  [place] for a walk yesterday to try to loosen things up -  Pt would like to know when to return to work   Pt has been taking aleve 2 tabs HS and 2 in the am  And salon pas patch and heating pad    H/o similar pain over 15 years ago when lifting child from crib  But not experiencing chronic back pain          The following portions of the patient's history were reviewed and updated as appropriate: allergies, current medications, past family history, past medical history, past social history, past surgical history and problem list   Review of Systems   Gastrointestinal:        No loss of bowel or bladder     Musculoskeletal:        Midline low back pain  Nonradiating  Pain worse with prolonged sitting or lying down  Or with shifting around positions in bed  ROS:  all others negative - no chest pain, SOB, normal urine and bowels  no GERD  sleeping well  mood good     Objective:    /64 (BP Location: Left arm, Patient Position: Sitting, Cuff Size: Adult)   Pulse 75   Resp 18   Ht 5' 2" (1 575 m)   Wt 62 3 kg (137 lb 6 4 oz)   LMP 11/25/2017   SpO2 99%   BMI 25 13 kg/m²   Wt Readings from Last 3 Encounters:   01/26/21 62 3 kg (137 lb 6 4 oz)   01/20/21 61 5 kg (135 lb 9 6 oz)   12/30/20 61 8 kg (136 lb 3 2 oz)     BP Readings from Last 3 Encounters:   01/26/21 100/64   12/30/20 119/73   08/14/20 124/75     Current Outpatient Medications   Medication Sig Dispense Refill    Calcium 500 MG tablet Take 1 tablet by mouth 2 (two) times a day      cholecalciferol (VITAMIN D3) 25 mcg (1,000 units) tablet       COLLAGEN-VITAMIN C PO Take by mouth daily      DAILY MULTIPLE VITAMINS/IRON TABS Take 1 tablet by mouth daily      VITAMIN D PO Take 5,000 Units by mouth daily      JUNEL FE 24 1-20 MG-MCG(24) per tablet TAKE 1 TABLET DAILY AS DIRECTED 84 tablet 4     No current facility-administered medications for this visit  Physical Exam  Vitals signs and nursing note reviewed  Constitutional:       General: She is not in acute distress  Appearance: She is normal weight  She is not ill-appearing or toxic-appearing  Cardiovascular:      Rate and Rhythm: Normal rate and regular rhythm  Pulses: Normal pulses  Heart sounds: No murmur  Pulmonary:      Effort: Pulmonary effort is normal  No respiratory distress  Breath sounds: No stridor  No wheezing, rhonchi or rales  Musculoskeletal:      Comments: Tenderness over mid lower back  No SI joint tenderness  Flattening of lumbar spine with forward bending  And decreased range of motion with forward bending  Slightly decreased backward bending  SB and rotation of low back essentially normal     Neurological:      Mental Status: She is alert  Comments: No radiculopathy into legs          Constitutional: she appears well-developed and well-nourished  HENT: Head: Normocephalic     Right Ear: External ear normal  Tympanic membrane normal    Left Ear: External ear normal  Tympanic membrane normal    Nose: Nose normal  No mucosal edema, No rhinorrhea  Right sinus exhibits no maxillary sinus tenderness  Left sinus exhibits no maxillary sinus tenderness  Mouth/Throat: Oropharynx is clear and moist    Eyes: Normal conjunctiva  No erythema  No discharge  Neck: No pain on exam  Neck supple  Cardiovascular: Normal rate, regular rhythm and normal heart sounds  Pulmonary/Chest: Effort normal and breath sounds normal    Abdominal: Soft  Bowel sounds are normal  There is no tenderness  Lymphadenopathy: she has no cervical adenopathy  Neurological: she is alert and oriented to person, place, and time  Skin: Skin is warm and dry  Psychiatric: she has a normal mood and affect   her behavior is normal

## 2021-01-29 ENCOUNTER — OFFICE VISIT (OUTPATIENT)
Dept: FAMILY MEDICINE CLINIC | Facility: CLINIC | Age: 51
End: 2021-01-29
Payer: COMMERCIAL

## 2021-01-29 VITALS
DIASTOLIC BLOOD PRESSURE: 82 MMHG | WEIGHT: 136 LBS | HEART RATE: 73 BPM | SYSTOLIC BLOOD PRESSURE: 130 MMHG | HEIGHT: 62 IN | RESPIRATION RATE: 12 BRPM | OXYGEN SATURATION: 100 % | BODY MASS INDEX: 25.03 KG/M2

## 2021-01-29 DIAGNOSIS — Z12.11 SCREENING FOR COLON CANCER: ICD-10-CM

## 2021-01-29 DIAGNOSIS — Z13.220 LIPID SCREENING: ICD-10-CM

## 2021-01-29 DIAGNOSIS — Z12.31 ENCOUNTER FOR SCREENING MAMMOGRAM FOR BREAST CANCER: ICD-10-CM

## 2021-01-29 DIAGNOSIS — Z00.00 ANNUAL PHYSICAL EXAM: Primary | ICD-10-CM

## 2021-01-29 DIAGNOSIS — Z13.1 DIABETES MELLITUS SCREENING: ICD-10-CM

## 2021-01-29 DIAGNOSIS — Z23 NEED FOR ZOSTER VACCINATION: ICD-10-CM

## 2021-01-29 PROCEDURE — 99396 PREV VISIT EST AGE 40-64: CPT | Performed by: PHYSICIAN ASSISTANT

## 2021-01-29 PROCEDURE — 3008F BODY MASS INDEX DOCD: CPT | Performed by: FAMILY MEDICINE

## 2021-01-29 PROCEDURE — 90750 HZV VACC RECOMBINANT IM: CPT

## 2021-01-29 PROCEDURE — 90471 IMMUNIZATION ADMIN: CPT

## 2021-01-29 RX ORDER — MULTIVIT-MIN/IRON/FOLIC ACID/K 18-600-40
2 CAPSULE ORAL DAILY
COMMUNITY
End: 2022-02-04

## 2021-01-29 NOTE — PROGRESS NOTES
ADULT ANNUAL [facility]    NAME: Dion Ag  AGE: 48 y o  SEX: female  : 1970     DATE: 2021     Assessment and Plan:     Problem List Items Addressed This Visit     None      Visit Diagnoses     Annual physical exam    -  Primary    BMI 24 0-24 9, adult        Screening for colon cancer        Relevant Orders    Ambulatory referral for colonoscopy    Encounter for screening mammogram for breast cancer        Relevant Orders    Mammo screening bilateral w 3d & cad    Diabetes mellitus screening        Relevant Orders    Comprehensive metabolic panel    Lipid screening        Relevant Orders    Lipid Panel with Direct LDL reflex    Need for zoster vaccination        Relevant Orders    Zoster Vaccine Recombinant IM (Completed)          Immunizations and preventive care screenings were discussed with patient today  Appropriate education was printed on patient's after visit summary  Counseling:  · Dental Health: discussed importance of regular dental visits (since noted she is behind recently)  Return in about 1 year (around 2022) for Annual physical      Chief Complaint:     Chief Complaint   Patient presents with    Annual Exam      History of Present Illness:     Adult Annual Physical   Patient here for a comprehensive physical exam  The patient reports no problems  Notes that she was seen recently with Dr Merari Carson for back pain but has been improving with Skelaxin for a few days    Also had lateral epicondylitis and saw Dr Sukh Jeff for this - reports improvement has been prolonged but not better with using brace        Diet and Physical Activity  · Diet/Nutrition: well balanced diet and consuming 3-5 servings of fruits/vegetables daily  · Exercise: walking and 4-7 times a week for 40 minutes        Depression Screening  PHQ-9 Depression Screening    PHQ-9:   Frequency of the following problems over the past two weeks:      Little interest or pleasure in doing things: 0 - not at all  Feeling down, depressed, or hopeless: 0 - not at all  PHQ-2 Score: 0       General Health  · Sleep: sleeps well  · Hearing: normal - bilateral   · Vision: goes for regular eye exams and wears glasses  · Dental: regular dental visits  (but notes that she is behind recently - reports thorough cleaning at home)    /GYN Health  · Patient is: perimenopausal  · Last menstrual period: about 1 month ago       Review of Systems:     Review of Systems   Constitutional: Negative for chills and fever  HENT: Negative for rhinorrhea and sore throat  Eyes: Negative for visual disturbance  Respiratory: Negative for cough, shortness of breath and wheezing  Cardiovascular: Negative for chest pain, palpitations and leg swelling  Gastrointestinal: Negative for abdominal pain, constipation, diarrhea, nausea and vomiting  Endocrine: Negative for polydipsia and polyuria  Genitourinary: Negative for dysuria  Musculoskeletal: Positive for arthralgias (resolving lateral epicondylitis) and back pain  Negative for myalgias  Skin: Negative for rash  Neurological: Negative for dizziness, syncope and headaches  Hematological: Does not bruise/bleed easily  Psychiatric/Behavioral: Negative for dysphoric mood  The patient is not nervous/anxious  Past Medical History:     History reviewed  No pertinent past medical history     Past Surgical History:     Past Surgical History:   Procedure Laterality Date    CERVICAL CONE BIOPSY        Social History:     E-Cigarette/Vaping    E-Cigarette Use Never User      E-Cigarette/Vaping Substances    Nicotine No     THC No     CBD No     Flavoring No     Other No     Unknown No      Social History     Socioeconomic History    Marital status: /Civil Union     Spouse name: None    Number of children: None    Years of education: None    Highest education level: None Occupational History    None   Social Needs    Financial resource strain: None    Food insecurity     Worry: None     Inability: None    Transportation needs     Medical: None     Non-medical: None   Tobacco Use    Smoking status: Never Smoker    Smokeless tobacco: Never Used   Substance and Sexual Activity    Alcohol use:  Yes     Alcohol/week: 7 0 standard drinks     Types: 7 Glasses of wine per week     Frequency: 4 or more times a week     Drinks per session: 1 or 2     Comment: 1-2 glasses of wine a night - daily    Drug use: Never    Sexual activity: Yes     Partners: Male     Birth control/protection: Pill   Lifestyle    Physical activity     Days per week: None     Minutes per session: None    Stress: None   Relationships    Social connections     Talks on phone: None     Gets together: None     Attends Jainism service: None     Active member of club or organization: None     Attends meetings of clubs or organizations: None     Relationship status: None    Intimate partner violence     Fear of current or ex partner: None     Emotionally abused: None     Physically abused: None     Forced sexual activity: None   Other Topics Concern    None   Social History Narrative    None      Family History:     Family History   Problem Relation Age of Onset    Diabetes Father     Coronary artery disease Father         4 stents    Lymphoma Father         non-Hodgkin's    Squamous cell carcinoma Mother     Cervical cancer Sister     Other Brother         pituitary tumor    No Known Problems Maternal Grandmother     No Known Problems Maternal Grandfather     No Known Problems Paternal Grandmother     No Known Problems Paternal Grandfather     No Known Problems Daughter     No Known Problems Paternal Aunt     No Known Problems Paternal Aunt       Current Medications:     Current Outpatient Medications   Medication Sig Dispense Refill    Ascorbic Acid (Vitamin C) 500 MG CAPS Take 2 capsules by mouth daily      Calcium 500 MG tablet Take 1 tablet by mouth 2 (two) times a day      COLLAGEN PO Take 1 tablet by mouth daily      DAILY MULTIPLE VITAMINS/IRON TABS Take 1 tablet by mouth daily      metaxalone (SKELAXIN) 800 mg tablet Take 1/2 to 1 tab po up to q 8 hours prn muscle spasm; avoid driving/working  While taking due to sedation 25 tablet 0    VITAMIN D PO Take 5,000 Units by mouth daily       No current facility-administered medications for this visit  Allergies:     No Known Allergies   Physical Exam:     /82 (BP Location: Left arm, Patient Position: Sitting, Cuff Size: Standard)   Pulse 73   Resp 12   Ht 5' 2" (1 575 m)   Wt 61 7 kg (136 lb)   LMP 11/25/2017   SpO2 100%   BMI 24 87 kg/m²     Physical Exam  Vitals signs reviewed  Constitutional:       General: She is not in acute distress  Appearance: Normal appearance  She is well-developed and normal weight  She is not ill-appearing  HENT:      Head: Normocephalic and atraumatic  Right Ear: Tympanic membrane, ear canal and external ear normal       Left Ear: Tympanic membrane, ear canal and external ear normal    Eyes:      Conjunctiva/sclera: Conjunctivae normal       Pupils: Pupils are equal, round, and reactive to light  Neck:      Musculoskeletal: Normal range of motion and neck supple  Thyroid: No thyromegaly  Cardiovascular:      Rate and Rhythm: Normal rate and regular rhythm  Pulses: Normal pulses  Heart sounds: Normal heart sounds  No murmur  Pulmonary:      Effort: Pulmonary effort is normal       Breath sounds: Normal breath sounds  No wheezing, rhonchi or rales  Abdominal:      General: Bowel sounds are normal  There is no distension  Palpations: Abdomen is soft  There is no mass  Tenderness: There is no abdominal tenderness  Musculoskeletal:      Right lower leg: No edema  Left lower leg: No edema  Lymphadenopathy:      Cervical: No cervical adenopathy  Skin:     General: Skin is warm and dry  Findings: No rash  Neurological:      Mental Status: She is alert  Sensory: No sensory deficit        Comments: 5/5 strength in UE and LE   Psychiatric:         Mood and Affect: Mood normal          Behavior: Behavior normal           Jeri Mello PA-C  [facility]

## 2021-01-29 NOTE — PATIENT INSTRUCTIONS

## 2021-02-06 ENCOUNTER — LAB (OUTPATIENT)
Dept: LAB | Facility: MEDICAL CENTER | Age: 51
End: 2021-02-06
Payer: COMMERCIAL

## 2021-02-06 DIAGNOSIS — Z13.220 LIPID SCREENING: ICD-10-CM

## 2021-02-06 DIAGNOSIS — Z13.1 DIABETES MELLITUS SCREENING: ICD-10-CM

## 2021-02-06 LAB
ALBUMIN SERPL BCP-MCNC: 4 G/DL (ref 3.5–5)
ALP SERPL-CCNC: 52 U/L (ref 46–116)
ALT SERPL W P-5'-P-CCNC: 31 U/L (ref 12–78)
ANION GAP SERPL CALCULATED.3IONS-SCNC: 2 MMOL/L (ref 4–13)
AST SERPL W P-5'-P-CCNC: 19 U/L (ref 5–45)
BILIRUB SERPL-MCNC: 0.42 MG/DL (ref 0.2–1)
BUN SERPL-MCNC: 13 MG/DL (ref 5–25)
CALCIUM SERPL-MCNC: 9 MG/DL (ref 8.3–10.1)
CHLORIDE SERPL-SCNC: 107 MMOL/L (ref 100–108)
CHOLEST SERPL-MCNC: 187 MG/DL (ref 50–200)
CO2 SERPL-SCNC: 31 MMOL/L (ref 21–32)
CREAT SERPL-MCNC: 0.62 MG/DL (ref 0.6–1.3)
GFR SERPL CREATININE-BSD FRML MDRD: 105 ML/MIN/1.73SQ M
GLUCOSE P FAST SERPL-MCNC: 103 MG/DL (ref 65–99)
HDLC SERPL-MCNC: 64 MG/DL
LDLC SERPL CALC-MCNC: 115 MG/DL (ref 0–100)
POTASSIUM SERPL-SCNC: 4.4 MMOL/L (ref 3.5–5.3)
PROT SERPL-MCNC: 6.9 G/DL (ref 6.4–8.2)
SODIUM SERPL-SCNC: 140 MMOL/L (ref 136–145)
TRIGL SERPL-MCNC: 41 MG/DL

## 2021-02-06 PROCEDURE — 80053 COMPREHEN METABOLIC PANEL: CPT

## 2021-02-06 PROCEDURE — 80061 LIPID PANEL: CPT

## 2021-02-06 PROCEDURE — 36415 COLL VENOUS BLD VENIPUNCTURE: CPT

## 2021-02-08 DIAGNOSIS — R73.01 IMPAIRED FASTING GLUCOSE: Primary | ICD-10-CM

## 2021-02-12 ENCOUNTER — LAB (OUTPATIENT)
Dept: LAB | Facility: MEDICAL CENTER | Age: 51
End: 2021-02-12
Payer: COMMERCIAL

## 2021-02-12 ENCOUNTER — ANNUAL EXAM (OUTPATIENT)
Dept: GYNECOLOGY | Facility: CLINIC | Age: 51
End: 2021-02-12
Payer: COMMERCIAL

## 2021-02-12 VITALS
WEIGHT: 136.8 LBS | BODY MASS INDEX: 25.17 KG/M2 | SYSTOLIC BLOOD PRESSURE: 100 MMHG | DIASTOLIC BLOOD PRESSURE: 60 MMHG | HEART RATE: 64 BPM | HEIGHT: 62 IN

## 2021-02-12 DIAGNOSIS — N95.1 PERIMENOPAUSE: ICD-10-CM

## 2021-02-12 DIAGNOSIS — Z01.419 ENCOUNTER FOR GYNECOLOGICAL EXAMINATION WITH PAPANICOLAOU SMEAR OF CERVIX: ICD-10-CM

## 2021-02-12 DIAGNOSIS — Z01.419 ENCOUNTER FOR GYNECOLOGICAL EXAMINATION WITHOUT ABNORMAL FINDING: Primary | ICD-10-CM

## 2021-02-12 LAB
EST. AVERAGE GLUCOSE BLD GHB EST-MCNC: 114 MG/DL
HBA1C MFR BLD: 5.6 %

## 2021-02-12 PROCEDURE — 36415 COLL VENOUS BLD VENIPUNCTURE: CPT | Performed by: PHYSICIAN ASSISTANT

## 2021-02-12 PROCEDURE — S0612 ANNUAL GYNECOLOGICAL EXAMINA: HCPCS | Performed by: OBSTETRICS & GYNECOLOGY

## 2021-02-12 PROCEDURE — G0145 SCR C/V CYTO,THINLAYER,RESCR: HCPCS | Performed by: OBSTETRICS & GYNECOLOGY

## 2021-02-12 PROCEDURE — 83036 HEMOGLOBIN GLYCOSYLATED A1C: CPT | Performed by: PHYSICIAN ASSISTANT

## 2021-02-12 NOTE — PROGRESS NOTES
Assessment/Plan:         Diagnoses and all orders for this visit:    Encounter for gynecological examination without abnormal finding    Perimenopause      Colon cancer screening:  Colonoscopy scheduled for next month  Subjective:      Patient ID: Sienan Anguiano is a 46 y o  female  HPI  patient presents for annual examination  She offers no complaints  Discontinue oral contraceptives last year  Since then she is experiencing infrequent menses anywhere from every 28-90 days  Denies any dysuria, hematuria, urgency  She has mild stress urinary incontinence  No GI complaints  Colonoscopy scheduled for next month  The following portions of the patient's history were reviewed and updated as appropriate:   She  has no past medical history on file  She   Patient Active Problem List    Diagnosis Date Noted    Lateral epicondylitis of left elbow 06/26/2020    Cervical intraepithelial neoplasia grade 1 01/07/2014     She  has a past surgical history that includes Cervical cone biopsy  Her family history includes Cervical cancer in her sister; Coronary artery disease in her father; Diabetes in her father; Lymphoma in her father; No Known Problems in her daughter, maternal grandfather, maternal grandmother, paternal aunt, paternal aunt, paternal grandfather, and paternal grandmother; Other in her brother; Squamous cell carcinoma in her mother  She  reports that she has never smoked  She has never used smokeless tobacco  She reports current alcohol use of about 7 0 standard drinks of alcohol per week  She reports that she does not use drugs    Current Outpatient Medications   Medication Sig Dispense Refill    Ascorbic Acid (Vitamin C) 500 MG CAPS Take 2 capsules by mouth daily      Calcium 500 MG tablet Take 1 tablet by mouth 2 (two) times a day      COLLAGEN PO Take 1 tablet by mouth daily      DAILY MULTIPLE VITAMINS/IRON TABS Take 1 tablet by mouth daily      VITAMIN D PO Take 5,000 Units by mouth daily      metaxalone (SKELAXIN) 800 mg tablet Take 1/2 to 1 tab po up to q 8 hours prn muscle spasm; avoid driving/working  While taking due to sedation (Patient not taking: Reported on 2/12/2021) 25 tablet 0     No current facility-administered medications for this visit  Current Outpatient Medications on File Prior to Visit   Medication Sig    Ascorbic Acid (Vitamin C) 500 MG CAPS Take 2 capsules by mouth daily    Calcium 500 MG tablet Take 1 tablet by mouth 2 (two) times a day    COLLAGEN PO Take 1 tablet by mouth daily    DAILY MULTIPLE VITAMINS/IRON TABS Take 1 tablet by mouth daily    VITAMIN D PO Take 5,000 Units by mouth daily    metaxalone (SKELAXIN) 800 mg tablet Take 1/2 to 1 tab po up to q 8 hours prn muscle spasm; avoid driving/working  While taking due to sedation (Patient not taking: Reported on 2/12/2021)     No current facility-administered medications on file prior to visit  She has No Known Allergies       Review of Systems   Constitutional: Negative  HENT: Negative for sore throat and trouble swallowing  Gastrointestinal: Negative  Genitourinary: Negative  Objective:      /60   Pulse 64   Ht 5' 2" (1 575 m)   Wt 62 1 kg (136 lb 12 8 oz)   LMP 01/11/2021   BMI 25 02 kg/m²          Physical Exam  Vitals signs reviewed  Constitutional:       Appearance: Normal appearance  She is normal weight  Neck:      Musculoskeletal: Normal range of motion and neck supple  No muscular tenderness  Cardiovascular:      Rate and Rhythm: Normal rate and regular rhythm  Pulses: Normal pulses  Heart sounds: Normal heart sounds  No murmur  Pulmonary:      Effort: Pulmonary effort is normal  No respiratory distress  Breath sounds: Normal breath sounds  Chest:      Breasts:         Right: No swelling, bleeding, inverted nipple, mass, nipple discharge, skin change or tenderness           Left: No swelling, bleeding, inverted nipple, mass, nipple discharge, skin change or tenderness  Abdominal:      General: Bowel sounds are normal  There is no distension  Palpations: Abdomen is soft  There is no mass  Tenderness: There is no abdominal tenderness  There is no guarding or rebound  Hernia: No hernia is present  There is no hernia in the left inguinal area or right inguinal area  Genitourinary:     General: Normal vulva  Labia:         Right: No rash, tenderness or lesion  Left: No rash, tenderness or lesion  Vagina: Normal       Cervix: Normal       Uterus: Normal        Adnexa:         Right: No mass, tenderness or fullness  Left: No mass, tenderness or fullness  Lymphadenopathy:      Cervical: No cervical adenopathy  Upper Body:      Right upper body: No supraclavicular, axillary or pectoral adenopathy  Left upper body: No supraclavicular, axillary or pectoral adenopathy  Lower Body: No right inguinal adenopathy  No left inguinal adenopathy  Neurological:      Mental Status: She is alert

## 2021-02-16 LAB
LAB AP GYN PRIMARY INTERPRETATION: NORMAL
Lab: NORMAL

## 2021-02-20 ENCOUNTER — TELEPHONE (OUTPATIENT)
Dept: OTHER | Facility: OTHER | Age: 51
End: 2021-02-20

## 2021-02-26 ENCOUNTER — ANESTHESIA (OUTPATIENT)
Dept: GASTROENTEROLOGY | Facility: MEDICAL CENTER | Age: 51
End: 2021-02-26

## 2021-02-26 ENCOUNTER — HOSPITAL ENCOUNTER (OUTPATIENT)
Dept: GASTROENTEROLOGY | Facility: MEDICAL CENTER | Age: 51
Setting detail: OUTPATIENT SURGERY
Discharge: HOME | End: 2021-02-26
Attending: INTERNAL MEDICINE
Payer: COMMERCIAL

## 2021-02-26 ENCOUNTER — TELEPHONE (OUTPATIENT)
Dept: GYNECOLOGY | Facility: CLINIC | Age: 51
End: 2021-02-26

## 2021-02-26 VITALS
SYSTOLIC BLOOD PRESSURE: 99 MMHG | WEIGHT: 133 LBS | OXYGEN SATURATION: 100 % | HEIGHT: 62 IN | DIASTOLIC BLOOD PRESSURE: 61 MMHG | BODY MASS INDEX: 24.48 KG/M2 | RESPIRATION RATE: 20 BRPM | HEART RATE: 70 BPM | TEMPERATURE: 98.1 F

## 2021-02-26 DIAGNOSIS — Z12.11 COLON CANCER SCREENING: ICD-10-CM

## 2021-02-26 LAB
EXT PREGNANCY TEST URINE: NEGATIVE
EXT. CONTROL: NORMAL

## 2021-02-26 PROCEDURE — 88305 TISSUE EXAM BY PATHOLOGIST: CPT | Performed by: PATHOLOGY

## 2021-02-26 PROCEDURE — 45385 COLONOSCOPY W/LESION REMOVAL: CPT | Performed by: INTERNAL MEDICINE

## 2021-02-26 PROCEDURE — 81025 URINE PREGNANCY TEST: CPT | Performed by: ANESTHESIOLOGY

## 2021-02-26 RX ORDER — SODIUM CHLORIDE 9 MG/ML
125 INJECTION, SOLUTION INTRAVENOUS CONTINUOUS
Status: DISCONTINUED | OUTPATIENT
Start: 2021-02-26 | End: 2021-03-02 | Stop reason: HOSPADM

## 2021-02-26 RX ORDER — PROPOFOL 10 MG/ML
INJECTION, EMULSION INTRAVENOUS AS NEEDED
Status: DISCONTINUED | OUTPATIENT
Start: 2021-02-26 | End: 2021-02-26

## 2021-02-26 RX ADMIN — PROPOFOL 30 MG: 10 INJECTION, EMULSION INTRAVENOUS at 10:20

## 2021-02-26 RX ADMIN — SODIUM CHLORIDE 125 ML/HR: 0.9 INJECTION, SOLUTION INTRAVENOUS at 09:55

## 2021-02-26 RX ADMIN — PROPOFOL 30 MG: 10 INJECTION, EMULSION INTRAVENOUS at 10:17

## 2021-02-26 RX ADMIN — PROPOFOL 30 MG: 10 INJECTION, EMULSION INTRAVENOUS at 10:34

## 2021-02-26 RX ADMIN — PROPOFOL 20 MG: 10 INJECTION, EMULSION INTRAVENOUS at 10:23

## 2021-02-26 RX ADMIN — PROPOFOL 20 MG: 10 INJECTION, EMULSION INTRAVENOUS at 10:18

## 2021-02-26 RX ADMIN — PROPOFOL 30 MG: 10 INJECTION, EMULSION INTRAVENOUS at 10:22

## 2021-02-26 RX ADMIN — PROPOFOL 30 MG: 10 INJECTION, EMULSION INTRAVENOUS at 10:29

## 2021-02-26 RX ADMIN — PROPOFOL 60 MG: 10 INJECTION, EMULSION INTRAVENOUS at 10:15

## 2021-02-26 RX ADMIN — PROPOFOL 30 MG: 10 INJECTION, EMULSION INTRAVENOUS at 10:25

## 2021-02-26 RX ADMIN — PROPOFOL 30 MG: 10 INJECTION, EMULSION INTRAVENOUS at 10:32

## 2021-02-26 RX ADMIN — Medication 40 MG: at 10:23

## 2021-02-26 RX ADMIN — PROPOFOL 20 MG: 10 INJECTION, EMULSION INTRAVENOUS at 10:27

## 2021-02-26 NOTE — DISCHARGE INSTRUCTIONS
Colonoscopy   WHAT YOU NEED TO KNOW:   A colonoscopy is a procedure to examine the inside of your colon (intestine) with a scope  Polyps or tissue growths may have been removed during your colonoscopy  It is normal to feel bloated and to have some abdominal discomfort  You should be passing gas  If you have hemorrhoids or you had polyps removed, you may have a small amount of bleeding  DISCHARGE INSTRUCTIONS:   Seek care immediately if:   · You have a large amount of bright red blood in your bowel movements  · Your abdomen is hard and firm and you have severe pain  · You have sudden trouble breathing  Contact your healthcare provider if:   · You develop a rash or hives  · You have a fever within 24 hours of your procedure       · You have not had a bowel movement for 3 days after your procedure  · You have questions or concerns about your condition or care  Activity:   · Do not lift, strain, or run  for 3 days after your procedure  · Rest after your procedure  You have been given medicine to relax you  Do not  drive or make important decisions until the day after your procedure  Return to your normal activity as directed  · Relieve gas and discomfort from bloating  by lying on your right side with a heating pad on your abdomen  You may need to take short walks to help the gas move out  Eat small meals until bloating is relieved  If you had polyps removed: For 7 days after your procedure:  · Do not  take aspirin  · Do not  go on long car rides  Follow up with your healthcare provider as directed:  Write down your questions so you remember to ask them during your visits  © 2017 [address] is for End User's use only and may not be sold, redistributed or otherwise used for commercial purposes  All illustrations and images included in CareNotes® are the copyrighted property of [employer]  or Alok [employer]ger    The above information is an  only  It is not intended as medical advice for individual conditions or treatments  Talk to your doctor, nurse or pharmacist before following any medical regimen to see if it is safe and effective for you  Colorectal Polyps   WHAT YOU NEED TO KNOW:   What are colorectal polyps? Colorectal polyps are small growths of tissue in the lining of the colon and rectum  Most polyps are hyperplastic polyps and are usually benign (noncancerous)  Certain types of polyps, called adenomatous polyps, may turn into cancer  What increases my risk of colorectal polyps? The exact cause of colorectal polyps is unknown  The following may increase your risk:  · Older age    · A diet of foods high in fat and low in fiber     · Family history of polyps    · Intestinal diseases, such as Crohn's disease or ulcerative colitis    · An unhealthy lifestyle, such as physical inactivity, smoking, or drinking alcohol    · Obesity    What are the signs and symptoms of colorectal polyps? · Blood in your bowel movement or bleeding from the rectum    · Change in bowel movement habits, such as diarrhea and constipation    · Abdominal pain    How are colorectal polyps diagnosed? You should have fecal blood screening once a year for colorectal disease if you are over 48years old  You should be screened earlier if you have an intestinal disease or a family history of polyps or colorectal cancer  During this screening, a sample of your bowel movement is checked for blood, which may be an early sign of colorectal polyps or cancer  You may also need any of the following tests:  · Digital rectal exam:  Your healthcare provider will examine your anus and use a finger to check your rectum for polyps  · Barium enema: A barium enema is an x-ray of the colon  A tube is put into your anus, and a liquid called barium is put through the tube  Barium is used so that healthcare providers can see your colon better on the x-ray film  · Virtual colonoscopy: This is a CT scan that takes pictures of the inside of your colon and rectum  A small, flexible tube is put into your rectum and air or carbon dioxide (gas) is used to expand your colon  This lets healthcare providers clearly see your colon and any polyps on a monitor  · Colonoscopy or sigmoidoscopy: These procedures help your healthcare provider see the inside of your colon using a flexible tube with a small light and camera on the end  During a sigmoidoscopy, your healthcare provider will only look at rectum and lower colon  During a colonoscopy, healthcare providers will look at the full length of your colon  Healthcare providers may remove a small amount of tissue from the colon for a biopsy  How are colorectal polyps treated? A polypectomy is a minimally invasive procedure to remove your polyps  They may be removed during a colonoscopy or sigmoidoscopy  Your healthcare provider may need to remove the polyps with a laparoscope  Laparoscopy is done by inserting a small, flexible scope into incisions made on your abdomen  What are the risks of colorectal polyps? You may bleed during a colonoscopy procedure  Your bowel may be perforated (torn) when polyps are removed  This may lead to an open abdominal surgery  During surgery, you may bleed too much or get an infection  Adenomatous polyps that are not removed may turn into cancer and become more difficult to treat  Where can I find support and more information? · Yamilet Quintero ([facility])  [address], [place] [address]  Phone: [phone]  Web Address: [place] Love  [facility] gov    When should I contact my healthcare provider? · You have a fever  · You have chills, a cough, or feel weak and achy  · You have abdominal pain that does not go away or gets worse after you take medicine  · Your abdomen is swollen       · You are losing weight without trying  · You have questions or concerns about your condition or care  When should I seek immediate care or call 911? · You have sudden shortness of breath  · You have a fast heart rate, fast breathing, or are too dizzy to stand up  · You have severe abdominal pain  · You see blood in your bowel movement  CARE AGREEMENT:   You have the right to help plan your care  Learn about your health condition and how it may be treated  Discuss treatment options with your healthcare providers to decide what care you want to receive  You always have the right to refuse treatment  The above information is an  only  It is not intended as medical advice for individual conditions or treatments  Talk to your doctor, nurse or pharmacist before following any medical regimen to see if it is safe and effective for you  © Copyright [address] Information is for End User's use only and may not be sold, redistributed or otherwise used for commercial purposes   All illustrations and images included in CareNotes® are the copyrighted property of [employer]  or [address] [place] [address]

## 2021-02-26 NOTE — H&P
History and Physical -  [facility]  Sienna Anguiano 46 y o  female MRN: [id number]                  HPI: Sienna Anguiano is a 46y o  year old female who presents for colonoscopy for colon cancer screening  No previous colonoscopy  REVIEW OF SYSTEMS: Per the HPI, and otherwise unremarkable  Historical Information   No past medical history on file  Past Surgical History:   Procedure Laterality Date    CERVICAL CONE BIOPSY       Social History   Social History     Substance and Sexual Activity   Alcohol Use Yes    Alcohol/week: 7 0 standard drinks    Types: 7 Glasses of wine per week    Frequency: 4 or more times a week    Drinks per session: 1 or 2    Comment: 1-2 glasses of wine a night - daily     Social History     Substance and Sexual Activity   Drug Use Never     Social History     Tobacco Use   Smoking Status Never Smoker   Smokeless Tobacco Never Used     Family History   Problem Relation Age of Onset    Diabetes Father     Coronary artery disease Father         4 stents    Lymphoma Father         non-Hodgkin's    Squamous cell carcinoma Mother     Cervical cancer Sister     Other Brother         pituitary tumor    No Known Problems Maternal Grandmother     No Known Problems Maternal Grandfather     No Known Problems Paternal Grandmother     No Known Problems Paternal Grandfather     No Known Problems Daughter     No Known Problems Paternal Aunt     No Known Problems Paternal Aunt        Meds/Allergies       Current Outpatient Medications:     Ascorbic Acid (Vitamin C) 500 MG CAPS    Calcium 500 MG tablet    COLLAGEN PO    DAILY MULTIPLE VITAMINS/IRON TABS    metaxalone (SKELAXIN) 800 mg tablet    VITAMIN D PO    No Known Allergies    Objective     There were no vitals taken for this visit        PHYSICAL EXAM    Gen: NAD  CV: RRR  CHEST: Clear  ABD: soft, NT/ND  EXT: no edema      ASSESSMENT/PLAN:  Sienna Anguiano is a 46y o  year old female who presents for colonoscopy for colon cancer screening  No previous colonoscopy  The patient is stable and optimized for the procedure, we reviewed risk and benefits  Risk include but not limited to infection, bleeding, perforation and missing a lesion

## 2021-02-26 NOTE — TELEPHONE ENCOUNTER
Pt had her PAP on 2/12 since then has had some heavy spotting using 2-3 tampons daily   Last period 1/11/2021 has been getting periods infrequently  But this has lasted 2 weeks now  Please advise     [phone]

## 2021-02-26 NOTE — ANESTHESIA PREPROCEDURE EVALUATION
Procedure:  COLONOSCOPY    Relevant Problems   ANESTHESIA (within normal limits)      CARDIO (within normal limits)      ENDO (within normal limits)      /RENAL (within normal limits)      HEMATOLOGY (within normal limits)      MUSCULOSKELETAL   (+) Lateral epicondylitis of left elbow      NEURO/PSYCH (within normal limits)      PULMONARY (within normal limits)        Physical Exam    Airway    Mallampati score: II  TM Distance: >3 FB  Neck ROM: full     Dental   No notable dental hx     Cardiovascular  Rhythm: regular, Rate: normal, Cardiovascular exam normal    Pulmonary  Pulmonary exam normal Breath sounds clear to auscultation,     Other Findings        Anesthesia Plan  ASA Score- 1     Anesthesia Type- IV sedation with anesthesia with ASA Monitors  Additional Monitors:   Airway Plan:           Plan Factors-    Chart reviewed  Patient summary reviewed  Patient is not a current smoker  Patient not instructed to abstain from smoking on day of procedure  Patient did not smoke on day of surgery  Induction- intravenous  Postoperative Plan-     Informed Consent- Anesthetic plan and risks discussed with patient

## 2021-03-03 ENCOUNTER — OFFICE VISIT (OUTPATIENT)
Dept: GYNECOLOGY | Facility: CLINIC | Age: 51
End: 2021-03-03
Payer: COMMERCIAL

## 2021-03-03 VITALS
SYSTOLIC BLOOD PRESSURE: 99 MMHG | BODY MASS INDEX: 24.48 KG/M2 | HEART RATE: 70 BPM | HEIGHT: 62 IN | WEIGHT: 133 LBS | DIASTOLIC BLOOD PRESSURE: 61 MMHG

## 2021-03-03 DIAGNOSIS — N93.9 ABNORMAL UTERINE BLEEDING (AUB): Primary | ICD-10-CM

## 2021-03-03 PROCEDURE — 99213 OFFICE O/P EST LOW 20 MIN: CPT | Performed by: OBSTETRICS & GYNECOLOGY

## 2021-03-03 PROCEDURE — 88305 TISSUE EXAM BY PATHOLOGIST: CPT | Performed by: PATHOLOGY

## 2021-03-03 PROCEDURE — 3008F BODY MASS INDEX DOCD: CPT | Performed by: OBSTETRICS & GYNECOLOGY

## 2021-03-03 PROCEDURE — 58100 BIOPSY OF UTERUS LINING: CPT | Performed by: OBSTETRICS & GYNECOLOGY

## 2021-03-03 RX ORDER — MEDROXYPROGESTERONE ACETATE 10 MG/1
10 TABLET ORAL DAILY
Qty: 10 TABLET | Refills: 0 | Status: SHIPPED | OUTPATIENT
Start: 2021-03-03 | End: 2022-02-04

## 2021-03-03 NOTE — PROGRESS NOTES
Patient presents to office for complaint of abnormal uterine bleeding  She discontinued oral contraceptives last February  She had a normal menses may Dora in July missed a few months then had an menses December and in January  She was here for annual examination on February the 12  Since then she has had nearly daily bleeding occasionally heavy  Denies any fever or abdominal pain  She is having minimal cramps  Physical exam:  Abdomen soft nontender no masses appreciated  Pelvic exam uterus is anteverted normal size and contour freely mobile and nontender  There are no palpable adnexal masses or tenderness  The cervix appears normal   Her Pap smear from February was negative  Procedure:  Cervix prepped with Betadine  Anterior lip cervix was grasped with single-tooth tenaculum  Pipelle was inserted into the endometrial cavity  Sounded to 7 cm  The Pipelle was removed in a circumferential manner obtaining a sample from all 4 quadrants of the uterus  Patient tolerated procedure well  Impression: Abnormal uterine bleeding  Patient will start on Provera 10 mg daily for 10 days    She will return to the office for transvaginal scan with saline infusion hysterosonography

## 2021-03-17 ENCOUNTER — ULTRASOUND (OUTPATIENT)
Dept: GYNECOLOGY | Facility: CLINIC | Age: 51
End: 2021-03-17
Payer: COMMERCIAL

## 2021-03-17 ENCOUNTER — OFFICE VISIT (OUTPATIENT)
Dept: GYNECOLOGY | Facility: CLINIC | Age: 51
End: 2021-03-17
Payer: COMMERCIAL

## 2021-03-17 DIAGNOSIS — N93.9 ABNORMAL UTERINE BLEEDING (AUB): Primary | ICD-10-CM

## 2021-03-17 PROCEDURE — 1036F TOBACCO NON-USER: CPT | Performed by: OBSTETRICS & GYNECOLOGY

## 2021-03-17 PROCEDURE — 76831 ECHO EXAM UTERUS: CPT | Performed by: OBSTETRICS & GYNECOLOGY

## 2021-03-17 PROCEDURE — 76830 TRANSVAGINAL US NON-OB: CPT | Performed by: OBSTETRICS & GYNECOLOGY

## 2021-03-17 PROCEDURE — 58340 CATHETER FOR HYSTEROGRAPHY: CPT | Performed by: OBSTETRICS & GYNECOLOGY

## 2021-03-17 PROCEDURE — 99212 OFFICE O/P EST SF 10 MIN: CPT | Performed by: OBSTETRICS & GYNECOLOGY

## 2021-03-17 NOTE — PROGRESS NOTES
Assessment/Plan:         Diagnoses and all orders for this visit:    Abnormal uterine bleeding (AUB):  Reviewed findings of ultrasound with patient  If AUB continues after this completed course of Provera patient will return to the office  Subjective:      Patient ID: Roger Baker is a 46 y o  female  HPI Patient presented 3/3 to office for complaint of abnormal uterine bleeding  She discontinued oral contraceptives last February  She had a normal menses may Dora in July missed a few months then had an menses December and in January  She was here for annual examination on February the 12  Since then she has had nearly daily bleeding occasionally heavy  Denies any fever or abdominal pain  She is having minimal cramps  Biopsy was obtained and that was benign  She was advised to return to the office for transvaginal scan possible saline infusion hysterosonography  She was placed on Provera 10 mg for 10 days  The following portions of the patient's history were reviewed and updated as appropriate:   She  has no past medical history on file  She   Patient Active Problem List    Diagnosis Date Noted    Lateral epicondylitis of left elbow 06/26/2020    Cervical intraepithelial neoplasia grade 1 01/07/2014     She  has a past surgical history that includes Cervical cone biopsy  Her family history includes Cervical cancer in her sister; Coronary artery disease in her father; Diabetes in her father; Lymphoma in her father; No Known Problems in her daughter, maternal grandfather, maternal grandmother, paternal aunt, paternal aunt, paternal grandfather, and paternal grandmother; Other in her brother; Squamous cell carcinoma in her mother  She  reports that she has never smoked  She has never used smokeless tobacco  She reports current alcohol use of about 7 0 standard drinks of alcohol per week  She reports that she does not use drugs    Current Outpatient Medications   Medication Sig Dispense Refill    Ascorbic Acid (Vitamin C) 500 MG CAPS Take 2 capsules by mouth daily      Calcium 500 MG tablet Take 1 tablet by mouth 2 (two) times a day      COLLAGEN PO Take 1 tablet by mouth daily      DAILY MULTIPLE VITAMINS/IRON TABS Take 1 tablet by mouth daily      medroxyPROGESTERone (PROVERA) 10 mg tablet Take 1 tablet (10 mg total) by mouth daily for 10 days 10 tablet 0    metaxalone (SKELAXIN) 800 mg tablet Take 1/2 to 1 tab po up to q 8 hours prn muscle spasm; avoid driving/working  While taking due to sedation (Patient not taking: Reported on 2/12/2021) 25 tablet 0    VITAMIN D PO Take 5,000 Units by mouth daily       No current facility-administered medications for this visit  Current Outpatient Medications on File Prior to Visit   Medication Sig    Ascorbic Acid (Vitamin C) 500 MG CAPS Take 2 capsules by mouth daily    Calcium 500 MG tablet Take 1 tablet by mouth 2 (two) times a day    COLLAGEN PO Take 1 tablet by mouth daily    DAILY MULTIPLE VITAMINS/IRON TABS Take 1 tablet by mouth daily    medroxyPROGESTERone (PROVERA) 10 mg tablet Take 1 tablet (10 mg total) by mouth daily for 10 days    metaxalone (SKELAXIN) 800 mg tablet Take 1/2 to 1 tab po up to q 8 hours prn muscle spasm; avoid driving/working  While taking due to sedation (Patient not taking: Reported on 2/12/2021)    VITAMIN D PO Take 5,000 Units by mouth daily     No current facility-administered medications on file prior to visit  She has No Known Allergies       Review of Systems      Objective: There were no vitals taken for this visit      AMB US Pelvic Non OB   Date/Time: 3/17/2021 9:17 AM  Performed by: Nyasia Briceno  Authorized by: Anne Sheth DO      Procedure details:     Technique:  Transvaginal US, Non-OB    Position: lithotomy exam    Uterine findings:     Length (cm): 8 74    Height (cm):  4 91    Width (cm):  5 92    Endometrial stripe: identified      Endometrium thickness (mm):  16 4  Left ovary findings:     Left ovary:  Visualized    Length (cm): 3 33    Height (cm): 1 72    Width (cm): 1 68  Right ovary findings:     Right ovary:  Visualized    Length (cm): 3 63    Height (cm): 2 25    Width (cm): 2 01  Other findings:     Free pelvic fluid: not identified      Free peritoneal fluid: not identified    Post-Procedure Details:     Impression:  Anteverted uterus is inhomogeneous throughout without fibroids  The left ovary appears within normal limits  The right ovary demonstrates a 1 8cm septated cyst  No free fluid  Tolerance: Tolerated well, no immediate complications    Complications: no complications    Additional Procedure Comments:      One Jackson P5 transvaginal transducer E8C with Serial Number [id number] was used during procedure and subsequently cleaned with high level disinfection utilizing the [employer]       Ultrasound performed at:   [address]  [address] [facility]  [address], [address] [facility]  Phone: [phone]  Fax:  [phone]     Sonohysterogram   Date/Time: 3/17/2021 9:21 AM  Performed by: Мария Lux DO  Authorized by: Мария Lux DO   [place] Protocol:  Patient identity confirmed: verbally with patient        Pre-procedure:     Prepped with: Betadine    Procedure:     Cervix cleaned and prepped: yes      Catheter inserted: yes      Uterine cavity distended with saline: yes    Post-procedure:     Patient observed: yes      Post procedure instructions given to patient: yes      Patient tolerated procedure well with no complications: yes    Comments:      Sonohysterogram demonstrates a smooth appearing endometrium

## 2021-03-17 NOTE — PROGRESS NOTES
AMB US Pelvic Non OB    Date/Time: 3/17/2021 9:17 AM  Performed by: Grace Orourke  Authorized by: Floresita Powers DO     Procedure details:     Technique:  Transvaginal US, Non-OB    Position: lithotomy exam    Uterine findings:     Length (cm): 8 74    Height (cm):  4 91    Width (cm):  5 92    Endometrial stripe: identified      Endometrium thickness (mm):  16 4  Left ovary findings:     Left ovary:  Visualized    Length (cm): 3 33    Height (cm): 1 72    Width (cm): 1 68  Right ovary findings:     Right ovary:  Visualized    Length (cm): 3 63    Height (cm): 2 25    Width (cm): 2 01  Other findings:     Free pelvic fluid: not identified      Free peritoneal fluid: not identified    Post-Procedure Details:     Impression:  Anteverted uterus is inhomogeneous throughout without fibroids  The left ovary appears within normal limits  The right ovary demonstrates a 1 8cm septated cyst  No free fluid  Tolerance: Tolerated well, no immediate complications    Complications: no complications    Additional Procedure Comments:      dooub P5 transvaginal transducer E8C with Serial Number [id number] was used during procedure and subsequently cleaned with high level disinfection utilizing the [email or web address] [facility]       Ultrasound performed at:     [facility] [address]  [address] [facility]  [address], [address] [facility]  Phone: [phone]  Fax:  [phone]    Date/Time: 3/17/2021 9:21 AM  Performed by: Floresita Powers DO  Authorized by: Floresita Powers DO   [place] Protocol:  Patient identity confirmed: verbally with patient      Pre-procedure:     Prepped with: Betadine    Procedure:     Cervix cleaned and prepped: yes      Catheter inserted: yes      Uterine cavity distended with saline: yes    Post-procedure:     Patient observed: yes      Post procedure instructions given to patient: yes      Patient tolerated procedure well with no complications: yes    Comments:      Sonohysterogram demonstrates a smooth appearing endometrium

## 2021-03-26 ENCOUNTER — CLINICAL SUPPORT (OUTPATIENT)
Dept: FAMILY MEDICINE CLINIC | Facility: CLINIC | Age: 51
End: 2021-03-26
Payer: COMMERCIAL

## 2021-03-26 DIAGNOSIS — Z23 NEED FOR ZOSTER VACCINATION: Primary | ICD-10-CM

## 2021-03-26 PROCEDURE — 90471 IMMUNIZATION ADMIN: CPT

## 2021-03-26 PROCEDURE — 90750 HZV VACC RECOMBINANT IM: CPT

## 2021-05-21 ENCOUNTER — HOSPITAL ENCOUNTER (OUTPATIENT)
Dept: MAMMOGRAPHY | Facility: MEDICAL CENTER | Age: 51
Discharge: HOME | End: 2021-05-21
Payer: COMMERCIAL

## 2021-05-21 VITALS — HEIGHT: 62 IN | BODY MASS INDEX: 24.48 KG/M2 | WEIGHT: 133 LBS

## 2021-05-21 DIAGNOSIS — Z12.31 ENCOUNTER FOR SCREENING MAMMOGRAM FOR BREAST CANCER: ICD-10-CM

## 2021-05-21 PROCEDURE — 77067 SCR MAMMO BI INCL CAD: CPT

## 2021-05-21 PROCEDURE — 77063 BREAST TOMOSYNTHESIS BI: CPT

## 2021-11-09 ENCOUNTER — TELEPHONE (OUTPATIENT)
Dept: DERMATOLOGY | Age: 51
End: 2021-11-09

## 2022-01-09 NOTE — PROGRESS NOTES
[facility]NUAL [facility]    NAME: Azucena Morgan  AGE: 52 y o  SEX: female  : 1970     DATE: 2020     Assessment and Plan:     Problem List Items Addressed This Visit     None      Visit Diagnoses     Annual physical exam    -  Primary    Relevant Orders    CBC and differential    Comprehensive metabolic panel    TSH, 3rd generation with Free T4 reflex    Lipid Panel with Direct LDL reflex    Human Immunodeficiency Virus 1/2 Antigen / Antibody ( Fourth Generation) with Reflex Testing    Encounter for screening mammogram for breast cancer        Relevant Orders    Mammo screening bilateral w 3d & cad    Screening for colorectal cancer        Relevant Orders    Ambulatory referral to Gastroenterology    Screening for HIV (human immunodeficiency virus)        Relevant Orders    Human Immunodeficiency Virus 1/2 Antigen / Antibody ( Fourth Generation) with Reflex Testing    Lipid screening        Relevant Orders    Lipid Panel with Direct LDL reflex    Diabetes mellitus screening        Relevant Orders    Comprehensive metabolic panel    Need for Tdap vaccination        Relevant Orders    TDAP VACCINE GREATER THAN OR EQUAL TO 6YO IM (Completed)      The USPSTF recommendation for HIV screening in all patients between 13and 72years old (once in lifetime or annually with risk factors) was discussed with the patient  The patient agreed to testing  Immunizations and preventive care screenings were discussed with patient today  Appropriate education was printed on patient's after visit summary      Counseling:  · Given Tdap and placed on [facility] waitlist         Return in 1 year (on 2021) for Annual physical      Chief Complaint:     Chief Complaint   Patient presents with    Annual Exam      History of Present Illness:     Adult Annual Physical   Patient here for a comprehensive physical exam  The patient reports no problems  Diet and Physical Activity  · Diet/Nutrition: well balanced diet and consuming 3-5 servings of fruits/vegetables daily  · Exercise: walking and daily tries to get 10K steps  Depression Screening  PHQ-9 Depression Screening    PHQ-9:    Frequency of the following problems over the past two weeks:       Little interest or pleasure in doing things:  0 - not at all  Feeling down, depressed, or hopeless:  0 - not at all  PHQ-2 Score:  0       General Health  · Sleep: sleeps well and gets more than 8 hours of sleep on average  · Hearing: normal - bilateral   · Vision: goes for regular eye exams, most recent eye exam <1 year ago and wears glasses  · Dental: regular dental visits  /GYN Health  · Patient is: possibly postmenopausal - thinks it has been over 12 months - likely 8/2018 - also on OCP     Review of Systems:     Review of Systems   Constitutional: Negative for chills and fever  HENT: Negative for rhinorrhea and sore throat  Eyes: Negative for visual disturbance  Respiratory: Negative for cough, shortness of breath and wheezing  Cardiovascular: Negative for chest pain, palpitations and leg swelling  Gastrointestinal: Negative for abdominal pain, constipation, diarrhea, nausea and vomiting  Endocrine: Negative for polydipsia and polyuria  Genitourinary: Negative for dysuria  Musculoskeletal: Positive for arthralgias (mild TTP in the elbows)  Negative for myalgias  Skin: Negative for rash  Neurological: Negative for dizziness, syncope and headaches  Hematological: Does not bruise/bleed easily  Psychiatric/Behavioral: Negative for dysphoric mood  The patient is not nervous/anxious  Past Medical History:     History reviewed  No pertinent past medical history     Past Surgical History:     Past Surgical History:   Procedure Laterality Date    CERVICAL CONE BIOPSY        Social History:     Social History     Socioeconomic History    Marital status: /Civil [employer]     Spouse name: None    Number of children: None    Years of education: None    Highest education level: None   Occupational History    None   Social Needs    Financial resource strain: None    Food insecurity:     Worry: None     Inability: None    Transportation needs:     Medical: None     Non-medical: None   Tobacco Use    Smoking status: Never Smoker    Smokeless tobacco: Never Used   Substance and Sexual Activity    Alcohol use:  Yes     Alcohol/week: 7 0 standard drinks     Types: 7 Glasses of wine per week     Comment: 1-2 glasses of wine a night - daily    Drug use: Never    Sexual activity: Yes     Partners: Male     Birth control/protection: Pill   Lifestyle    Physical activity:     Days per week: None     Minutes per session: None    Stress: None   Relationships    Social connections:     Talks on phone: None     Gets together: None     Attends Taoist service: None     Active member of club or organization: None     Attends meetings of clubs or organizations: None     Relationship status: None    Intimate partner violence:     Fear of current or ex partner: None     Emotionally abused: None     Physically abused: None     Forced sexual activity: None   Other Topics Concern    None   Social History Narrative    None      Family History:     Family History   Problem Relation Age of Onset    Diabetes Father     Coronary artery disease Father         4 stents    Lymphoma Father         non-Hodgkin's    Squamous cell carcinoma Mother     Cervical cancer Sister     Other Brother         pituitary tumor      Current Medications:     Current Outpatient Medications   Medication Sig Dispense Refill    Calcium 500 MG tablet Take 1 tablet by mouth 2 (two) times a day      DAILY MULTIPLE VITAMINS/IRON TABS Take 1 tablet by mouth daily      JUNEL FE 24 1-20 MG-MCG(24) per tablet TAKE 1 TABLET DAILY AS DIRECTED 84 tablet 4     No current facility-administered medications for this visit  Allergies:     No Known Allergies   Physical Exam:     /76   Pulse 76   Resp 16   Ht 5' 2" (1 575 m)   Wt 60 4 kg (133 lb 3 2 oz)   SpO2 98%   BMI 24 36 kg/m²     Physical Exam   Constitutional: She appears well-developed and well-nourished  No distress  HENT:   Head: Normocephalic and atraumatic  Right Ear: Hearing, tympanic membrane, external ear and ear canal normal    Left Ear: Hearing, tympanic membrane, external ear and ear canal normal    Nose: Nose normal    Mouth/Throat: Oropharynx is clear and moist and mucous membranes are normal    Eyes: Pupils are equal, round, and reactive to light  Conjunctivae and lids are normal    Neck: Trachea normal and normal range of motion  Neck supple  Carotid bruit is not present  No thyroid mass and no thyromegaly present  Cardiovascular: Normal rate, regular rhythm, S1 normal, S2 normal, normal heart sounds and intact distal pulses  No murmur heard  Pulses:       Radial pulses are 2+ on the right side, and 2+ on the left side  Posterior tibial pulses are 2+ on the right side, and 2+ on the left side  Pulmonary/Chest: Effort normal and breath sounds normal  She has no decreased breath sounds  She has no wheezes  She has no rhonchi  She has no rales  Abdominal: Soft  Normal appearance and bowel sounds are normal  She exhibits no distension and no mass  There is no hepatosplenomegaly  There is no tenderness  No hernia  Musculoskeletal: Normal range of motion  She exhibits no edema  Lymphadenopathy:     She has no cervical adenopathy  Neurological: She is alert  She has normal strength  No sensory deficit (light touch sensation intact and equal in UE and LE bilaterally)  Skin: Skin is warm and dry  No rash noted  Psychiatric: She has a normal mood and affect  Her behavior is normal    Vitals reviewed        Sam Sloan PA-C  [facility]

## 2022-02-02 NOTE — PROGRESS NOTES
[facility]ULT [facility]NUAL [facility]    NAME: Tripp Valdes  AGE: 46 y o  SEX: female  : 1970     DATE: 2022     Assessment and Plan:     Problem List Items Addressed This Visit     None      Visit Diagnoses     Annual physical exam    -  Primary    Relevant Orders    CBC and differential    Comprehensive metabolic panel    Lipid Panel with Direct LDL reflex    TSH, 3rd generation with Free T4 reflex    Hepatitis C antibody    Encounter for screening mammogram for breast cancer        Relevant Orders    Mammo screening bilateral w 3d & cad    Lipid screening        Relevant Orders    Lipid Panel with Direct LDL reflex    Diabetes mellitus screening        Relevant Orders    Comprehensive metabolic panel    Need for hepatitis C screening test        Relevant Orders    Hepatitis C antibody          Immunizations and preventive care screenings were discussed with patient today  Appropriate education was printed on patient's after visit summary  Counseling:  · Exercise: the importance of regular exercise/physical activity was discussed  Recommend exercise 3-5 times per week for at least 30 minutes  Depression Screening and Follow-up Plan: Patient was screened for depression during today's encounter  They screened negative with a PHQ-2 score of 0  Return in about 1 year (around 2023) for Annual physical      Chief Complaint:     No chief complaint on file  History of Present Illness:     Adult Annual Physical   Patient here for a comprehensive physical exam  The patient reports problems - as below      Last year - labs were normal other than IFG (103 but A1c 5 6%) and elevated LDL (115 but ASCVD risk was 0 6%) - since last year, diet and exercise have been good - patient notes that she has been eating probably the healthiest in her life    Diet and Physical Activity  · Diet/Nutrition: well balanced diet and consuming 3-5 servings of fruits/vegetables daily  · Exercise: walking and work out videos 3+ times a week  Depression Screening  PHQ-2/9 Depression Screening    Little interest or pleasure in doing things: 0 - not at all  Feeling down, depressed, or hopeless: 0 - not at all  PHQ-2 Score: 0  PHQ-2 Interpretation: Negative depression screen       General Health  · Sleep: sleeps well  · Hearing: normal - bilateral   · Vision: most recent eye exam >1 year ago and wears glasses  · Dental: regular dental visits  /GYN Health  · Patient is: perimenopausal  · Last menstrual period: 11 months ago       Review of Systems:     Review of Systems   Constitutional: Negative for chills and fever  HENT: Negative for rhinorrhea and sore throat  Eyes: Negative for visual disturbance  Respiratory: Negative for cough, shortness of breath and wheezing  Cardiovascular: Negative for chest pain, palpitations and leg swelling  Gastrointestinal: Negative for abdominal pain, blood in stool, constipation, diarrhea, nausea and vomiting  Endocrine: Negative for polydipsia and polyuria  Genitourinary: Negative for dysuria and frequency  Musculoskeletal: Negative for arthralgias and myalgias  Skin: Negative for rash  Neurological: Negative for dizziness, syncope and headaches  Hematological: Does not bruise/bleed easily  Psychiatric/Behavioral: Negative for dysphoric mood  The patient is not nervous/anxious  Past Medical History:     History reviewed  No pertinent past medical history     Past Surgical History:     Past Surgical History:   Procedure Laterality Date    CERVICAL CONE BIOPSY        Social History:     Social History     Socioeconomic History    Marital status: /Civil Union     Spouse name: None    Number of children: None    Years of education: None    Highest education level: None   Occupational History    None   Tobacco Use    Smoking status: Never Smoker    Smokeless tobacco: Never Used   Vaping Use    Vaping Use: Never used   Substance and Sexual Activity    Alcohol use: Yes     Alcohol/week: 7 0 standard drinks     Types: 7 Glasses of wine per week     Comment: 1-2 glasses of wine a few times a week    Drug use: Never    Sexual activity: Yes     Partners: Male     Birth control/protection: None   Other Topics Concern    None   Social History Narrative    None     Social Determinants of Health     Financial Resource Strain: Not on file   Food Insecurity: Not on file   Transportation Needs: Not on file   Physical Activity: Not on file   Stress: Not on file   Social Connections: Not on file   Intimate Partner Violence: Not on file   Housing Stability: Not on file      Family History:     Family History   Problem Relation Age of Onset    Diabetes Father     Coronary artery disease Father         4 stents    Lymphoma Father         non-Hodgkin's    Squamous cell carcinoma Mother     Cervical cancer Sister 28    Other Brother         pituitary tumor    No Known Problems Maternal Grandmother     No Known Problems Maternal Grandfather     No Known Problems Paternal Grandmother     No Known Problems Paternal Grandfather     No Known Problems Daughter     No Known Problems Paternal Aunt     No Known Problems Paternal Aunt       Current Medications:     Current Outpatient Medications   Medication Sig Dispense Refill    Calcium 500 MG tablet Take 1 tablet by mouth 2 (two) times a day      DAILY MULTIPLE VITAMINS/IRON TABS Take 1 tablet by mouth daily      Multiple Vitamins-Minerals (ZINC PO) Take by mouth daily      VITAMIN D PO Take 5,000 Units by mouth daily       No current facility-administered medications for this visit        Allergies:     No Known Allergies   Physical Exam:     /74 (BP Location: Left arm, Patient Position: Sitting, Cuff Size: Adult)   Pulse 64   Temp (!) 97 2 °F (36 2 °C) (Temporal)   Ht 5' 2" (1 575 m)   Wt 62 kg (136 lb 9 6 oz)   SpO2 98% BMI 24 98 kg/m²     Physical Exam  Vitals reviewed  Constitutional:       General: She is not in acute distress  Appearance: Normal appearance  She is well-developed and normal weight  She is not ill-appearing  HENT:      Head: Normocephalic and atraumatic  Right Ear: Tympanic membrane, ear canal and external ear normal       Left Ear: Tympanic membrane, ear canal and external ear normal    Eyes:      Conjunctiva/sclera: Conjunctivae normal       Pupils: Pupils are equal, round, and reactive to light  Neck:      Thyroid: No thyromegaly  Vascular: No carotid bruit  Cardiovascular:      Rate and Rhythm: Normal rate and regular rhythm  Pulses: Normal pulses  Heart sounds: Normal heart sounds  No murmur heard  Pulmonary:      Effort: Pulmonary effort is normal       Breath sounds: Normal breath sounds  No wheezing or rales  Abdominal:      General: Bowel sounds are normal  There is no distension  Palpations: Abdomen is soft  There is no mass  Tenderness: There is no abdominal tenderness  Musculoskeletal:      Cervical back: Normal range of motion and neck supple  Right lower leg: No edema  Left lower leg: No edema  Lymphadenopathy:      Cervical: No cervical adenopathy  Skin:     General: Skin is warm and dry  Findings: No rash  Neurological:      Mental Status: She is alert  Sensory: No sensory deficit  Comments: 5/5 strength in UE and LE   Psychiatric:         Mood and Affect: Mood normal          Behavior: Behavior normal          Thought Content:  Thought content normal          Judgment: Judgment normal           Ameena Burroughs PA-C  [facility]

## 2022-02-04 ENCOUNTER — OFFICE VISIT (OUTPATIENT)
Dept: FAMILY MEDICINE CLINIC | Facility: CLINIC | Age: 52
End: 2022-02-04
Payer: COMMERCIAL

## 2022-02-04 VITALS
TEMPERATURE: 97.2 F | HEIGHT: 62 IN | BODY MASS INDEX: 25.14 KG/M2 | SYSTOLIC BLOOD PRESSURE: 118 MMHG | WEIGHT: 136.6 LBS | DIASTOLIC BLOOD PRESSURE: 74 MMHG | OXYGEN SATURATION: 98 % | HEART RATE: 64 BPM

## 2022-02-04 DIAGNOSIS — Z13.1 DIABETES MELLITUS SCREENING: ICD-10-CM

## 2022-02-04 DIAGNOSIS — Z13.220 LIPID SCREENING: ICD-10-CM

## 2022-02-04 DIAGNOSIS — Z11.59 NEED FOR HEPATITIS C SCREENING TEST: ICD-10-CM

## 2022-02-04 DIAGNOSIS — Z00.00 ANNUAL PHYSICAL EXAM: Primary | ICD-10-CM

## 2022-02-04 DIAGNOSIS — Z12.31 ENCOUNTER FOR SCREENING MAMMOGRAM FOR BREAST CANCER: ICD-10-CM

## 2022-02-04 PROCEDURE — 1036F TOBACCO NON-USER: CPT | Performed by: PHYSICIAN ASSISTANT

## 2022-02-04 PROCEDURE — 3008F BODY MASS INDEX DOCD: CPT | Performed by: PHYSICIAN ASSISTANT

## 2022-02-04 PROCEDURE — 99396 PREV VISIT EST AGE 40-64: CPT | Performed by: PHYSICIAN ASSISTANT

## 2022-02-04 NOTE — PATIENT INSTRUCTIONS

## 2022-03-01 ENCOUNTER — OFFICE VISIT (OUTPATIENT)
Dept: DERMATOLOGY | Facility: CLINIC | Age: 52
End: 2022-03-01
Payer: COMMERCIAL

## 2022-03-01 VITALS — WEIGHT: 135 LBS | HEIGHT: 62 IN | TEMPERATURE: 98.4 F | BODY MASS INDEX: 24.84 KG/M2

## 2022-03-01 DIAGNOSIS — D23.9 CONGENITAL LIPOMATOUS OVERGROWTH, VASCULAR MALFORMATIONS, EPIDERMAL NEVI, AND SKELETAL ABNORMALITIES: ICD-10-CM

## 2022-03-01 DIAGNOSIS — E88.2 CONGENITAL LIPOMATOUS OVERGROWTH, VASCULAR MALFORMATIONS, EPIDERMAL NEVI, AND SKELETAL ABNORMALITIES: ICD-10-CM

## 2022-03-01 DIAGNOSIS — L81.4 SOLAR LENTIGO: ICD-10-CM

## 2022-03-01 DIAGNOSIS — Q27.9 CONGENITAL LIPOMATOUS OVERGROWTH, VASCULAR MALFORMATIONS, EPIDERMAL NEVI, AND SKELETAL ABNORMALITIES: ICD-10-CM

## 2022-03-01 DIAGNOSIS — Q78.9 CONGENITAL LIPOMATOUS OVERGROWTH, VASCULAR MALFORMATIONS, EPIDERMAL NEVI, AND SKELETAL ABNORMALITIES: ICD-10-CM

## 2022-03-01 DIAGNOSIS — D22.9 MULTIPLE BENIGN MELANOCYTIC NEVI: ICD-10-CM

## 2022-03-01 DIAGNOSIS — L82.1 SEBORRHEIC KERATOSIS: Primary | ICD-10-CM

## 2022-03-01 PROCEDURE — 1036F TOBACCO NON-USER: CPT | Performed by: DERMATOLOGY

## 2022-03-01 PROCEDURE — 99213 OFFICE O/P EST LOW 20 MIN: CPT | Performed by: DERMATOLOGY

## 2022-03-01 NOTE — PROGRESS NOTES
[facility] Follow Up Note    Patient Name: Janusz Sargent  Encounter Date: 03/01/22    Today's Chief Concerns:   Concern #1:  skin check      Current Medications:    Current Outpatient Medications:     Calcium 500 MG tablet, Take 1 tablet by mouth 2 (two) times a day, Disp: , Rfl:     DAILY MULTIPLE VITAMINS/IRON TABS, Take 1 tablet by mouth daily, Disp: , Rfl:     Multiple Vitamins-Minerals (ZINC PO), Take by mouth daily, Disp: , Rfl:     VITAMIN D PO, Take 5,000 Units by mouth daily, Disp: , Rfl:     CONSTITUTIONAL:   Vitals:    03/01/22 1606   Temp: 98 4 °F (36 9 °C)   TempSrc: Temporal   Weight: 61 2 kg (135 lb)   Height: 5' 2" (1 575 m)     Specific Alerts:    Have you been seen by a [facility]ermatologist in the last 3 years? YES    Are you pregnant or planning to become pregnant? No    Are you currently or planning to be nursing or breast feeding? No    No Known Allergies    May we call your Preferred Phone number to discuss your specific medical information? YES    May we leave a detailed message that includes your specific medical information? YES    Have you traveled outside of the [facility] in the past 3 months? No    Do you currently have a pacemaker or defibrillator? No    Do you have any artificial heart valves, joints, plates, screws, rods, stents, pins, etc? No   - If Yes, were any placed within the last 2 years? Do you require any medications prior to a surgical procedure? No     Are you taking any medications that cause you to bleed more easily ("blood thinners") No    Have you ever experienced a rapid heartbeat with epinephrine? No    Have you ever been treated with "gold" (gold sodium thiomalate) therapy? No    [facility] can help with wrinkles, "laugh lines," facial volume loss, "double chin," "love handles," age spots, and more  Are you interested in learning today about some of the skin enhancement procedures that we offer?  (If Yes, please provide more detail) No    Review of Systems:  Have you recently had or currently have any of the following? · Fever or chills: No  · Night Sweats: No  · Headaches: No  · Weight Gain: No  · Weight Loss: No  · Blurry Vision: No  · Nausea: No  · Vomiting: No  · Diarrhea: No  · Blood in Stool: No  · Abdominal Pain: No  · Itchy Skin: No  · Painful Joints: No  · Swollen Joints: No  · Muscle Pain: No  · Irregular Mole: No  · Sun Burn: No  · Dry Skin: YES  · Skin Color Changes: No  · Scar or Keloid: No  · Cold Sores/Fever Blisters: No  · Bacterial Infections/MRSA: No  · Anxiety: No  · Depression: No  · Suicidal or Homicidal Thoughts: No      PSYCH: Normal mood and affect  EYES: Normal conjunctiva  ENT: Normal lips and oral mucosa  CARDIOVASCULAR: No edema  RESPIRATORY: Normal respirations  HEME/LYMPH/IMMUNO:  No regional lymphadenopathy except as noted below in ASSESSMENT AND PLAN BY DIAGNOSIS    FULL ORGAN SYSTEM SKIN EXAM (SKIN)  Hair, Scalp, Ears, Face Normal except as noted below in Assessment   Neck, Cervical Chain Nodes Normal except as noted below in Assessment   Right Arm/Hand/Fingers Normal except as noted below in Assessment   Left Arm/Hand/Fingers Normal except as noted below in Assessment   Chest/Breasts/Axillae Viewed areas Normal except as noted below in Assessment   Abdomen, Umbilicus Normal except as noted below in Assessment   Back/Spine Normal except as noted below in Assessment   Right Leg, Foot, Toes Normal except as noted below in Assessment   Left Leg, Foot, Toes Normal except as noted below in Assessment     SEBORRHEIC KERATOSIS; NON-INFLAMED    Physical Exam   Anatomic Location Affected:  back   Morphological Description:  Flat and raised, waxy, smooth to warty textured, yellow to brownish-grey to dark brown to blackish, discrete, "stuck-on" appearing papules   Pertinent Positives:   Pertinent Negatives:     Additional History of Present Condition:  Patient reports new bumps on the skin   Denies itch, burn, pain, bleeding or ulceration  Present constantly; nothing seems to make it worse or better  No prior treatment  Assessment and Plan:  Based on a thorough discussion of this condition and the management approach to it (including a comprehensive discussion of the known risks, side effects and potential benefits of treatment), the patient (family) agrees to implement the following specific plan:   Reassured benign    Seborrheic Keratosis  A seborrheic keratosis is a harmless warty spot that appears during adult life as a common sign of skin aging  Seborrheic keratoses can arise on any area of skin, covered or uncovered, with the usual exception of the palms and soles  They do not arise from mucous membranes  Seborrheic keratoses can have highly variable appearance  Seborrheic keratoses are extremely common  It has been estimated that over 90% of adults over the age of 61 years have one or more of them  They occur in males and females of all races, typically beginning to erupt in the 35s or 45s  They are uncommon under the age of 21 years  The precise cause of seborrhoeic keratoses is not known  Seborrhoeic keratoses are considered degenerative in nature  As time goes by, seborrheic keratoses tend to become more numerous  Some people inherit a tendency to develop a very large number of them; some people may have hundreds of them  The name "seborrheic keratosis" is misleading, because these lesions are not limited to a seborrhoeic distribution (scalp, mid-face, chest, upper back), nor are they formed from sebaceous glands, nor are they associated with sebum -- which is greasy    Seborrheic keratosis may also be called "SK," "Seb K," "basal cell papilloma," "senile wart," or "barnacle "      Researchers have noted:   Eruptive seborrhoeic keratoses can follow sunburn or dermatitis   Skin friction may be the reason they appear in body folds   Viral cause (e g , human papillomavirus) seems unlikely   Stable and clonal mutations or activation of FRFR3, PIK3CA, TEMO, AKT1 and EGFR genes are found in seborrhoeic keratoses   Seborrhoeic keratosis can arise from solar lentigo   FRFR3 mutations also arise in solar lentigines  These mutations are associated with increased age and location on the head and neck, suggesting a role of ultraviolet radiation in these lesions   Seborrheic keratoses do not harbour tumour suppressor gene mutations   Epidermal growth factor receptor inhibitors, which are used to treat some cancers, often result in an increase in verrucal (warty) keratoses  There is no easy way to remove multiple lesions on a single occasion  Unless a specific lesion is "inflamed" and is causing pain or stinging/burning or is bleeding, most insurance companies do not authorize treatment  SOLAR LENTIGINES      Physical Exam:  · Anatomic Location Affected:  Sun exposed areas of back, chest, arms, legs  · Morphological Description:  Multiple scattered brown to tan evenly pigmented macules   · Denies pain, itch, bleeding  No treatments tried  Present for months - years  Reports getting newer lesions with sun exposure          Assessment and Plan:  Based on a thorough discussion of this condition and the management approach to it (including a comprehensive discussion of the known risks, side effects and potential benefits of treatment), the patient (family) agrees to implement the following specific plan:  · Reassure benign  · Use sun protection   Apply SPF 30 or higher at least three times a day   Wear sun protecting clothing and hats       MELANOCYTIC NEVI ("Moles")    Physical Exam:   Anatomic Location Affected: trunk and extremeties   Morphological Description:  Scattered, 1-4mm round to ovoid, symmetrical-appearing, even bordered, skin colored to dark brown macules/papules, mostly in sun-exposed areas   Pertinent Positives:   Pertinent Negatives:     Additional History of Present Condition:  Present for years    Assessment and Plan:  Based on a thorough discussion of this condition and the management approach to it (including a comprehensive discussion of the known risks, side effects and potential benefits of treatment), the patient (family) agrees to implement the following specific plan:   Reassured benign     Melanocytic Nevi  Melanocytic nevi ("moles") are caused by collections of the color producing skin cells, or melanocytes, in 1 area in the skin  They can range in color from pink to dark brown and be either raised or flat  Some moles are present at birth (I e , "congenital nevi"), while others come up later in life (i e , "acquired nevi")  Ra Reed exposure also stimulates the body to make more moles, ie the more sun you get the more moles you'll grow  Clinically distinguishing a healthy mole from melanoma may be difficult  The "ABCDE's" of moles have been suggested as a means of helping to alert a person to a suspicious mole and the possible increased risk of melanoma  Asymmetry: Healthy moles tend to be symmetric, while melanomas are often asymmetric  Asymmetry means if you draw a line through the mole, the two halves do not match in color, size, shape, or surface texture Any mole that starts to demonstrate "asymmetry" should be examined promptly by a board certified dermatologist      Border: Healthy moles tend to have discrete, even borders  The border of a melanoma often blends into the normal skin and does not sharply delineate the mole from normal skin  Any mole that starts to demonstrate "uneven borders" should be examined promptly     Color: Healthy moles tend to be one color throughout  Melanomas tend to be made up of different colors ranging from dark black, blue, white, or red    Any mole that demonstrates a color change should be examined promptly    Diameter: Healthy moles tend to be smaller than 0 6 cm in size; an exception are "congenital nevi" that can be larger  Melanomas tend to grow and can often be greater than 0 6 cm (1/4 of an inch, or the size of a pencil eraser)  This is only a guideline, and many normal moles may be larger than 0 6 cm without being unhealthy  Any mole that starts to change in size (small to bigger or bigger to smaller) should be examined promptly    Evolving: Healthy moles tend to "stay the same "  Melanomas may often show signs of change or evolution such as a change in size, shape, color, or elevation  Any mole that starts to itch, bleed, crust, burn, hurt, or ulcerate or demonstrate a change or evolution should be examined promptly by a board certified dermatologist       What are atypical moles or dysplastic nevi? Dysplastic moles are moles that have some of the ABCDE  changes listed above but  are not cancerous  Sometimes a biopsy and microscopic examination are needed to determine the difference  They may indicate an increased risk of melanoma in that person, especially if there is a family history of melanoma  What is a Melanoma? The main concern when looking at a new or changing mole it to evaluate whether it may be a melanoma  The appearance of a "new mole" remains one of the most reliable methods for identifying a malignant melanoma  A melanoma is a type of skin cancer that can be deadly if it spreads throughout the body  The prognosis of a Melanoma depends on how deep it has penetrated in the skin  If caught early, they generally will not have had time to grow into the deeper layers of the skin and they cure rate is then very high  Once the melanoma grows deeper into the skin, the cure rate drops dramatically  Therefore, early detection and removal of a malignant melanoma results in a much better chance of complete cure      CONGENITAL MELANOCYTIC NEVUS    Physical Exam:   Anatomic Location Affected:  chest   Morphological Description:  0 3 cm brown pigmented macule   Pertinent Positives:   Pertinent Negatives: Additional History of Present Condition:  Patient remembers having a long time; noticed no change    Assessment and Plan:  Based on a thorough discussion of this condition and the management approach to it (including a comprehensive discussion of the known risks, side effects and potential benefits of treatment), the patient (family) agrees to implement the following specific plan:   Will monitor   Follow up 2-3 years    What is a congenital melanocytic nevus? A congenital melanocytic nevus is a proliferation of benign melanocytes that are present at birth or develop shortly after birth  This form of a congenital nevus is also known as a brown birthmark  Similar melanocytic nevi, or moles that were not present at birth, are often called 'congenital melanocytic nevus-like' nevi, 'congenital type' nevi or 'tardive' nevi  2013 Classification of congenital melanocytic nevi  In 2013, a new categorisation of congenital melanocytic nevi using predicted adult size was proposed:   Small (< 1 5 cm)   Medium (M1: 1 5-10 cm, M2: > 10-20 cm)   Large (L1: > 20-30 cm, L2: > 30-40 cm)   Giant (G1: > 40-60 cm, G2: > 60 cm)   Satellite nevi: none, 1-20, > 20-50, and > 50 satellites    Congenital melanocytic nevi should be described according to their body site, colors, surface features and whether or not there is hypertrichosis (hairs)  Progression over time  Congenital melanocytic nevi usually grow proportionally with the child  As a rough guide, the likely adult size of a congenital nevus can be calculated as follows:   Lower limbs: adult size is x 3 3 size at birth   Upper limbs/torso: adult size is x 2 8 size at birth   Head: adult size is x 1 7 size at birth  Descriptive names of some congenital nevi  Some congenital nevi are given specific descriptive names  Some of these are listed here    Speckled lentiginous nevus   Also called nevus spilus   Dark spots on a flat tan background   The number of spots may increase or decrease over time  Satellite lesions   Found on the periphery of central congenital melanocytic nevus or elsewhere on the body   Smaller melanocytic nevi similar in appearance to    Present in > 70% of patients with a large congenital melanocytic nevus  Tardive nevus   Melanocytic nevus that appears after birth   Slower growth and less synthesis of melanin than congenital nevus   Histopathology is similar to true congenital melanocytic nevi  Garment nevus   The name relates to the anatomical location of nevus   Bathing trunk nevus involves central areas usually covered by a bathing costume, for example, buttocks   Coat sleeve nevus involves an entire arm and proximal shoulder  Halo nevus   Affects some congenital and tardive melanocytic nevi   Surrounding skin becomes lighter or white   The central lesion may also become lighter and smaller and may disappear   Due to immune destruction of melanocytes    How common are congenital melanocytic nevi?  Small congenital nevi occur in 1 in 100 births   Medium congenital nevi occur in 1 in 1000 births    Giant congenital melanocytic nevi are much rarer (1 in 20,000 live births)  They occur in all races and ethnic groups, and males and females are at equal risk  What do congenital melanocytic nevi look like? Congenital melanocytic nevi present as single or multi-shaded, round or oval-shaped pigmented patches  They may have increased hair growth (hypertrichosis)  The surface may be slightly rough or bumpy  Congenital nevi usually enlarge as the child grows but they may sometimes become smaller and less obvious with time  Rarely some may even disappear  However, they may also become darker, raised, more bumpy and hairy, particularly around the time of puberty  Do congenital melanocytic nevi cause any symptoms?   Congenital melanocytic nevi are usually asymptomatic, however, some may be itchy, particularly larger lesions  It is thought there may be a reduced function of sebaceous (oil) and eccrine (sweat) glands, which may result in skin dryness and a heightened sensation of itch  The overlying skin may become fragile and erode or ulcerate  Deep nests of melanocytes in the dermis may weaken the bonds between the epidermis and the dermis and account for skin fragility  Congenital melanocytic nevi are often unsightly, especially when extensive, ie large or giant congenital melanocytic nevi  They may, therefore, result in anxiety and impaired self-image, especially when the lesions are in visible areas  Giant melanocytic nevi, and to a lesser degree small lesions, are associated with increased risk of developing cutaneous melanoma, neurocutaneous melanoma and rarely other tumours (see below)  What causes a congenital melanocytic nevus? Congenital melanocytic nevi are caused by localised genetic abnormalities resulting in the proliferation of melanocytes; these are cells in the skin responsible for normal skin color  This abnormal proliferation is thought to occur between the 5th and 24th weeks of gestation  If proliferation starts early in development, giant and medium-sized congenital melanocytic nevi are formed  Smaller congenital melanocytic nevi are formed later in development after the melanoblasts (immature melanocytes) have migrated from the neural crest to the skin  In some cases, there is also overgrowth of hair-forming cells and epidermis, forming an organoid nevus  Very early onset of congenital nevus before the separation of the upper and lower eyelids results in kissing nevi, ie one part of the nevus is on the upper lid and the other part is on the lower eyelid  How is the diagnosis of congenital melanocytic nevus made? The diagnosis of a congenital melanocytic nevus is usually based on the clinical appearance   If there is any doubt, examining the lesion with dermoscopy or taking a sample of the lesion for histology (biopsy) may show characteristic microscopic features  Dermoscopy  Evaluation of the congenital melanocytic nevus by dermoscopy will reveal the pattern of pigmentation and its symmetry or lack of symmetry  The most common global pattern of congenital or tardive melanocytic nevus is globular, but reticular, structureless and mixed patterns may occur  The nevus may have differing structures across the lesion, sometimes leading to overall asymmetry of the structure  Pathology  Congenital melanocytic nevi are usually larger than acquired nevi (which are melanocytic nevi that appear after 3years of age), and the nevus cells often extend deeper into the dermis, fat layer, and deeper structures  The nevus cells characteristically cluster around blood vessels, hair follicles, sebaceous and eccrine glands, and other skin structures  Congenital nevus cells tend to involve collagen bundles in the deeper layers of the skin more than is the case in an acquired nevus  Risk of developing melanoma within a congenital melanocytic nevus  The following characteristics of congenital melanocytic nevus are associated with the increased risk of development of melanoma (a skin cancer)   Large or giant size   Axial or paravertebral location (crossing the spine)   Multiple congenital satellite nevi   Neurocutaneous melanosis  The risk of melanoma is mainly related to the size of the congenital melanocytic nevus  Small and medium-sized congenital melanocytic nevi have a very small risk, well under 1%  Melanoma is more likely to develop in giant congenital nevi (lifetime estimates are 5-10%), particularly in lesions that lie across the spine or where there are multiple satellite lesions  Melanoma can start deep inside the nevus or within any neuromelanosis found in the brain and spinal cord   Very rarely, other tissues that contain melanocytes may also be a source of melanoma such as the gastrointestinal tract mucosa  In 24% of cases, the origin of the melanoma cannot be identified  Melanoma associated with a giant congenital melanocytic nevus or neuromelanosis can be very difficult to detect and treat  The risk of development of melanoma is greater in early childhood; 70% of melanomas associated with giant congenital melanocytic nevi are diagnosed by the age of [de-identified] years  Rarely, other types of tumour may develop within giant congenital melanocytic nevi including benign tumours (lipomas, schwannomas) and other malignant tumours (including sarcomas)  Melanoma can also develop within a small congenital melanocytic nevus  This is rare and likely to occur on the periphery of the nevus during adult life  Is regular follow-up recommended?  It can be useful to have a close-up photograph of the congenital nevus with a ruler beside it to assess for changes in size   Digital surveillance using dermoscopic images (mole mapping) may also be helpful to detect changes in structure  However, such changes are normal in childhood and should not usually give rise to concern   It is advisable to continue neurodevelopmental observation in those at risk of neurocutaneous melanosis  Prognosis of melanoma associated with congenital melanocytic nevus  Unfortunately, when a rare melanoma arises within a giant congenital melanocytic nevus, the prognosis is unfavourable  This is due to the deeper origin of the tumour rendering it more difficult to detect on clinical examination, resulting in a later stage at presentation  The deeper location also facilitates earlier spread through blood and lymph vessels  In 24% of cases, the melanoma has already spread to other sites (metastases) at the time of the first diagnosis      Treatment of a congenital melanocytic nevus  Management of a congenital melanocytic nevus must take into account the age of the subject, the lesion size, the location and depth, and the risk of developing malignant change within the lesion  Giant congenital melanocytic nevus  The only definite indication for surgery in a giant congenital melanocytic nevus is when melanoma develops within it  Small congenital nevus  If a small congenital nevus is growing at the same rate as the child and is not changing in any other way, the usual practice is not to remove it until the child is old enough to co-operate with a local anaesthetic injection, usually around the age of 8 to 15 years  Even then, removal is not essential   Reasons to consider surgical removal may include:   Unsightly appearance   Difficulty in observing the mole (eg, scalp, back)   A recent change in the lesion (darkening, lumpiness, increasing size)   Melanoma-like appearance (irregular shape, variegated color)  Prophylactic surgical removal of a nevus  The following factors should be considered prior to prophylactic surgical removal of a nevus   Prophylactic excision of a small lesion may be delayed until an age when the patient is old enough to make an informed choice   Small or medium-sized congenital melanocytic nevi are at low risk for developing malignant change   Irregular, lumpy or thick lesions or lesions that are difficult to clinically assess may have a lower threshold for consideration of surgical excision, so as not to miss a melanoma   50% of melanomas diagnosed in those with giant congenital melanocytic nevi occur at another body site such as within the central nervous system  Therefore surgical excision of the lesion may not eliminate the risk of melanoma   Large or giant melanocytic lesions may be too large to excise completely   Large lesions may require a skin flap or graft to close the surgical defect      Complications of surgery  Complications that may occur after surgery include:   Graft or flap failure   Infection   Wound breakdown   Bleeding or haematoma   Hypertrophic or keloid scar   Irritable or itchy scar  Other treatment options for a congenital melanocytic nevus    Dermabrasion  Dermabrasion can allow partial removal of a large congenital nevus; deeper nevus cells may persist  Dermabrasion may lighten the color of the nevus but may not reduce hair growth within it  It can cause scarring  Tangential (shave) excision  Tangential or shave excision uses a blade to remove the top layers of the skin (epidermis and upper dermis)  This may reduce the pigmentation but the lesion may not be completely removed  Shave excision may result in significant scarring  Chemical peels  Chemical peels using trichloroacetic acid or phenol may lighten the pigmentation of a superficial (surface) congenital nevus that is located in the upper layers of the skin  Laser ablation  Laser treatment is considered if surgical intervention is not possible  They may result in lightening of the lesion   Suitable devices include:   Jonna Q-switched laser   Carbon dioxide resurfacing laser       Scribe Attestation    I,:  Selena Oakes am acting as a scribe while in the presence of the attending physician :       I,:  Heriberto Del Angel MD personally performed the services described in this documentation    as scribed in my presence :

## 2022-03-01 NOTE — PATIENT INSTRUCTIONS
SEBORRHEIC KERATOSIS; NON-INFLAMED    Assessment and Plan:  Based on a thorough discussion of this condition and the management approach to it (including a comprehensive discussion of the known risks, side effects and potential benefits of treatment), the patient (family) agrees to implement the following specific plan:   Reassured benign    Seborrheic Keratosis  A seborrheic keratosis is a harmless warty spot that appears during adult life as a common sign of skin aging  Seborrheic keratoses can arise on any area of skin, covered or uncovered, with the usual exception of the palms and soles  They do not arise from mucous membranes  Seborrheic keratoses can have highly variable appearance  Seborrheic keratoses are extremely common  It has been estimated that over 90% of adults over the age of 61 years have one or more of them  They occur in males and females of all races, typically beginning to erupt in the 35s or 45s  They are uncommon under the age of 21 years  The precise cause of seborrhoeic keratoses is not known  Seborrhoeic keratoses are considered degenerative in nature  As time goes by, seborrheic keratoses tend to become more numerous  Some people inherit a tendency to develop a very large number of them; some people may have hundreds of them  The name "seborrheic keratosis" is misleading, because these lesions are not limited to a seborrhoeic distribution (scalp, mid-face, chest, upper back), nor are they formed from sebaceous glands, nor are they associated with sebum -- which is greasy    Seborrheic keratosis may also be called "SK," "Seb K," "basal cell papilloma," "senile wart," or "barnacle "      Researchers have noted:   Eruptive seborrhoeic keratoses can follow sunburn or dermatitis   Skin friction may be the reason they appear in body folds   Viral cause (e g , human papillomavirus) seems unlikely   Stable and clonal mutations or activation of FRFR3, PIK3CA, TEMO, AKT1 and EGFR genes are found in seborrhoeic keratoses   Seborrhoeic keratosis can arise from solar lentigo   FRFR3 mutations also arise in solar lentigines  These mutations are associated with increased age and location on the head and neck, suggesting a role of ultraviolet radiation in these lesions   Seborrheic keratoses do not harbour tumour suppressor gene mutations   Epidermal growth factor receptor inhibitors, which are used to treat some cancers, often result in an increase in verrucal (warty) keratoses  There is no easy way to remove multiple lesions on a single occasion  Unless a specific lesion is "inflamed" and is causing pain or stinging/burning or is bleeding, most insurance companies do not authorize treatment  SOLAR LENTIGINES      Assessment and Plan:  Based on a thorough discussion of this condition and the management approach to it (including a comprehensive discussion of the known risks, side effects and potential benefits of treatment), the patient (family) agrees to implement the following specific plan:  · Reassure benign  · Use sun protection   Apply SPF 30 or higher at least three times a day   Wear sun protecting clothing and hats       MELANOCYTIC NEVI ("Moles")    Assessment and Plan:  Based on a thorough discussion of this condition and the management approach to it (including a comprehensive discussion of the known risks, side effects and potential benefits of treatment), the patient (family) agrees to implement the following specific plan:   Reassured benign     Melanocytic Nevi  Melanocytic nevi ("moles") are caused by collections of the color producing skin cells, or melanocytes, in 1 area in the skin  They can range in color from pink to dark brown and be either raised or flat  Some moles are present at birth (I e , "congenital nevi"), while others come up later in life (i e , "acquired nevi")    Chip Dear exposure also stimulates the body to make more moles, ie the more sun you get the more moles you'll grow  Clinically distinguishing a healthy mole from melanoma may be difficult  The "ABCDE's" of moles have been suggested as a means of helping to alert a person to a suspicious mole and the possible increased risk of melanoma  Asymmetry: Healthy moles tend to be symmetric, while melanomas are often asymmetric  Asymmetry means if you draw a line through the mole, the two halves do not match in color, size, shape, or surface texture Any mole that starts to demonstrate "asymmetry" should be examined promptly by a board certified dermatologist      Border: Healthy moles tend to have discrete, even borders  The border of a melanoma often blends into the normal skin and does not sharply delineate the mole from normal skin  Any mole that starts to demonstrate "uneven borders" should be examined promptly     Color: Healthy moles tend to be one color throughout  Melanomas tend to be made up of different colors ranging from dark black, blue, white, or red  Any mole that demonstrates a color change should be examined promptly    Diameter: Healthy moles tend to be smaller than 0 6 cm in size; an exception are "congenital nevi" that can be larger  Melanomas tend to grow and can often be greater than 0 6 cm (1/4 of an inch, or the size of a pencil eraser)  This is only a guideline, and many normal moles may be larger than 0 6 cm without being unhealthy  Any mole that starts to change in size (small to bigger or bigger to smaller) should be examined promptly    Evolving: Healthy moles tend to "stay the same "  Melanomas may often show signs of change or evolution such as a change in size, shape, color, or elevation  Any mole that starts to itch, bleed, crust, burn, hurt, or ulcerate or demonstrate a change or evolution should be examined promptly by a board certified dermatologist       What are atypical moles or dysplastic nevi?   Dysplastic moles are moles that have some of the ABCDE  changes listed above but  are not cancerous  Sometimes a biopsy and microscopic examination are needed to determine the difference  They may indicate an increased risk of melanoma in that person, especially if there is a family history of melanoma  What is a Melanoma? The main concern when looking at a new or changing mole it to evaluate whether it may be a melanoma  The appearance of a "new mole" remains one of the most reliable methods for identifying a malignant melanoma  A melanoma is a type of skin cancer that can be deadly if it spreads throughout the body  The prognosis of a Melanoma depends on how deep it has penetrated in the skin  If caught early, they generally will not have had time to grow into the deeper layers of the skin and they cure rate is then very high  Once the melanoma grows deeper into the skin, the cure rate drops dramatically  Therefore, early detection and removal of a malignant melanoma results in a much better chance of complete cure  CONGENITAL MELANOCYTIC NEVUS    Assessment and Plan:  Based on a thorough discussion of this condition and the management approach to it (including a comprehensive discussion of the known risks, side effects and potential benefits of treatment), the patient (family) agrees to implement the following specific plan:   Will monitor   Follow up 2-3 years    What is a congenital melanocytic nevus? A congenital melanocytic nevus is a proliferation of benign melanocytes that are present at birth or develop shortly after birth  This form of a congenital nevus is also known as a brown birthmark  Similar melanocytic nevi, or moles that were not present at birth, are often called 'congenital melanocytic nevus-like' nevi, 'congenital type' nevi or 'tardive' nevi      2013 Classification of congenital melanocytic nevi  In 2013, a new categorisation of congenital melanocytic nevi using predicted adult size was proposed:   Small (< 1 5 cm)   Medium (M1: 1 5-10 cm, M2: > 10-20 cm)   Large (L1: > 20-30 cm, L2: > 30-40 cm)   Giant (G1: > 40-60 cm, G2: > 60 cm)   Satellite nevi: none, 1-20, > 20-50, and > 50 satellites    Congenital melanocytic nevi should be described according to their body site, colors, surface features and whether or not there is hypertrichosis (hairs)  Progression over time  Congenital melanocytic nevi usually grow proportionally with the child  As a rough guide, the likely adult size of a congenital nevus can be calculated as follows:   Lower limbs: adult size is x 3 3 size at birth   Upper limbs/torso: adult size is x 2 8 size at birth   Head: adult size is x 1 7 size at birth  Descriptive names of some congenital nevi  Some congenital nevi are given specific descriptive names  Some of these are listed here  Speckled lentiginous nevus   Also called nevus spilus   Dark spots on a flat tan background   The number of spots may increase or decrease over time  Satellite lesions   Found on the periphery of central congenital melanocytic nevus or elsewhere on the body   Smaller melanocytic nevi similar in appearance to    Present in > 70% of patients with a large congenital melanocytic nevus  Tardive nevus   Melanocytic nevus that appears after birth   Slower growth and less synthesis of melanin than congenital nevus   Histopathology is similar to true congenital melanocytic nevi  Garment nevus   The name relates to the anatomical location of nevus   Bathing trunk nevus involves central areas usually covered by a bathing costume, for example, buttocks   Coat sleeve nevus involves an entire arm and proximal shoulder  Halo nevus   Affects some congenital and tardive melanocytic nevi   Surrounding skin becomes lighter or white   The central lesion may also become lighter and smaller and may disappear   Due to immune destruction of melanocytes    How common are congenital melanocytic nevi?    Small congenital nevi occur in 1 in 100 births   Medium congenital nevi occur in 1 in 56 births    Giant congenital melanocytic nevi are much rarer (1 in 20,000 live births)  They occur in all races and ethnic groups, and males and females are at equal risk  What do congenital melanocytic nevi look like? Congenital melanocytic nevi present as single or multi-shaded, round or oval-shaped pigmented patches  They may have increased hair growth (hypertrichosis)  The surface may be slightly rough or bumpy  Congenital nevi usually enlarge as the child grows but they may sometimes become smaller and less obvious with time  Rarely some may even disappear  However, they may also become darker, raised, more bumpy and hairy, particularly around the time of puberty  Do congenital melanocytic nevi cause any symptoms? Congenital melanocytic nevi are usually asymptomatic, however, some may be itchy, particularly larger lesions  It is thought there may be a reduced function of sebaceous (oil) and eccrine (sweat) glands, which may result in skin dryness and a heightened sensation of itch  The overlying skin may become fragile and erode or ulcerate  Deep nests of melanocytes in the dermis may weaken the bonds between the epidermis and the dermis and account for skin fragility  Congenital melanocytic nevi are often unsightly, especially when extensive, ie large or giant congenital melanocytic nevi  They may, therefore, result in anxiety and impaired self-image, especially when the lesions are in visible areas  Giant melanocytic nevi, and to a lesser degree small lesions, are associated with increased risk of developing cutaneous melanoma, neurocutaneous melanoma and rarely other tumours (see below)  What causes a congenital melanocytic nevus? Congenital melanocytic nevi are caused by localised genetic abnormalities resulting in the proliferation of melanocytes; these are cells in the skin responsible for normal skin color   This abnormal proliferation is thought to occur between the 5th and 24th weeks of gestation  If proliferation starts early in development, giant and medium-sized congenital melanocytic nevi are formed  Smaller congenital melanocytic nevi are formed later in development after the melanoblasts (immature melanocytes) have migrated from the neural crest to the skin  In some cases, there is also overgrowth of hair-forming cells and epidermis, forming an organoid nevus  Very early onset of congenital nevus before the separation of the upper and lower eyelids results in kissing nevi, ie one part of the nevus is on the upper lid and the other part is on the lower eyelid  How is the diagnosis of congenital melanocytic nevus made? The diagnosis of a congenital melanocytic nevus is usually based on the clinical appearance  If there is any doubt, examining the lesion with dermoscopy or taking a sample of the lesion for histology (biopsy) may show characteristic microscopic features  Dermoscopy  Evaluation of the congenital melanocytic nevus by dermoscopy will reveal the pattern of pigmentation and its symmetry or lack of symmetry  The most common global pattern of congenital or tardive melanocytic nevus is globular, but reticular, structureless and mixed patterns may occur  The nevus may have differing structures across the lesion, sometimes leading to overall asymmetry of the structure  Pathology  Congenital melanocytic nevi are usually larger than acquired nevi (which are melanocytic nevi that appear after 3years of age), and the nevus cells often extend deeper into the dermis, fat layer, and deeper structures  The nevus cells characteristically cluster around blood vessels, hair follicles, sebaceous and eccrine glands, and other skin structures  Congenital nevus cells tend to involve collagen bundles in the deeper layers of the skin more than is the case in an acquired nevus      Risk of developing melanoma within a congenital melanocytic nevus  The following characteristics of congenital melanocytic nevus are associated with the increased risk of development of melanoma (a skin cancer)   Large or giant size   Axial or paravertebral location (crossing the spine)   Multiple congenital satellite nevi   Neurocutaneous melanosis  The risk of melanoma is mainly related to the size of the congenital melanocytic nevus  Small and medium-sized congenital melanocytic nevi have a very small risk, well under 1%  Melanoma is more likely to develop in giant congenital nevi (lifetime estimates are 5-10%), particularly in lesions that lie across the spine or where there are multiple satellite lesions  Melanoma can start deep inside the nevus or within any neuromelanosis found in the brain and spinal cord  Very rarely, other tissues that contain melanocytes may also be a source of melanoma such as the gastrointestinal tract mucosa  In 24% of cases, the origin of the melanoma cannot be identified  Melanoma associated with a giant congenital melanocytic nevus or neuromelanosis can be very difficult to detect and treat  The risk of development of melanoma is greater in early childhood; 70% of melanomas associated with giant congenital melanocytic nevi are diagnosed by the age of [de-identified] years  Rarely, other types of tumour may develop within giant congenital melanocytic nevi including benign tumours (lipomas, schwannomas) and other malignant tumours (including sarcomas)  Melanoma can also develop within a small congenital melanocytic nevus  This is rare and likely to occur on the periphery of the nevus during adult life  Is regular follow-up recommended?  It can be useful to have a close-up photograph of the congenital nevus with a ruler beside it to assess for changes in size   Digital surveillance using dermoscopic images (mole mapping) may also be helpful to detect changes in structure   However, such changes are normal in childhood and should not usually give rise to concern   It is advisable to continue neurodevelopmental observation in those at risk of neurocutaneous melanosis  Prognosis of melanoma associated with congenital melanocytic nevus  Unfortunately, when a rare melanoma arises within a giant congenital melanocytic nevus, the prognosis is unfavourable  This is due to the deeper origin of the tumour rendering it more difficult to detect on clinical examination, resulting in a later stage at presentation  The deeper location also facilitates earlier spread through blood and lymph vessels  In 24% of cases, the melanoma has already spread to other sites (metastases) at the time of the first diagnosis  Treatment of a congenital melanocytic nevus  Management of a congenital melanocytic nevus must take into account the age of the subject, the lesion size, the location and depth, and the risk of developing malignant change within the lesion  Giant congenital melanocytic nevus  The only definite indication for surgery in a giant congenital melanocytic nevus is when melanoma develops within it  Small congenital nevus  If a small congenital nevus is growing at the same rate as the child and is not changing in any other way, the usual practice is not to remove it until the child is old enough to co-operate with a local anaesthetic injection, usually around the age of 8 to 15 years  Even then, removal is not essential   Reasons to consider surgical removal may include:   Unsightly appearance   Difficulty in observing the mole (eg, scalp, back)   A recent change in the lesion (darkening, lumpiness, increasing size)   Melanoma-like appearance (irregular shape, variegated color)  Prophylactic surgical removal of a nevus  The following factors should be considered prior to prophylactic surgical removal of a nevus     Prophylactic excision of a small lesion may be delayed until an age when the patient is old enough to make an informed choice   Small or medium-sized congenital melanocytic nevi are at low risk for developing malignant change   Irregular, lumpy or thick lesions or lesions that are difficult to clinically assess may have a lower threshold for consideration of surgical excision, so as not to miss a melanoma   50% of melanomas diagnosed in those with giant congenital melanocytic nevi occur at another body site such as within the central nervous system  Therefore surgical excision of the lesion may not eliminate the risk of melanoma   Large or giant melanocytic lesions may be too large to excise completely   Large lesions may require a skin flap or graft to close the surgical defect  Complications of surgery  Complications that may occur after surgery include:   Graft or flap failure   Infection   Wound breakdown   Bleeding or haematoma   Hypertrophic or keloid scar   Irritable or itchy scar  Other treatment options for a congenital melanocytic nevus    Dermabrasion  Dermabrasion can allow partial removal of a large congenital nevus; deeper nevus cells may persist  Dermabrasion may lighten the color of the nevus but may not reduce hair growth within it  It can cause scarring  Tangential (shave) excision  Tangential or shave excision uses a blade to remove the top layers of the skin (epidermis and upper dermis)  This may reduce the pigmentation but the lesion may not be completely removed  Shave excision may result in significant scarring  Chemical peels  Chemical peels using trichloroacetic acid or phenol may lighten the pigmentation of a superficial (surface) congenital nevus that is located in the upper layers of the skin  Laser ablation  Laser treatment is considered if surgical intervention is not possible  They may result in lightening of the lesion   Suitable devices include:   Jonna Q-switched laser   Carbon dioxide resurfacing laser

## 2022-03-04 ENCOUNTER — APPOINTMENT (OUTPATIENT)
Dept: LAB | Facility: MEDICAL CENTER | Age: 52
End: 2022-03-04
Payer: COMMERCIAL

## 2022-03-04 DIAGNOSIS — Z00.00 ANNUAL PHYSICAL EXAM: ICD-10-CM

## 2022-03-04 DIAGNOSIS — Z13.220 LIPID SCREENING: ICD-10-CM

## 2022-03-04 DIAGNOSIS — Z13.1 DIABETES MELLITUS SCREENING: ICD-10-CM

## 2022-03-04 DIAGNOSIS — Z11.59 NEED FOR HEPATITIS C SCREENING TEST: ICD-10-CM

## 2022-03-04 LAB
ALBUMIN SERPL BCP-MCNC: 4.3 G/DL (ref 3.5–5)
ALP SERPL-CCNC: 60 U/L (ref 46–116)
ALT SERPL W P-5'-P-CCNC: 30 U/L (ref 12–78)
ANION GAP SERPL CALCULATED.3IONS-SCNC: 4 MMOL/L (ref 4–13)
AST SERPL W P-5'-P-CCNC: 16 U/L (ref 5–45)
BASOPHILS # BLD AUTO: 0.04 THOUSANDS/ΜL (ref 0–0.1)
BASOPHILS NFR BLD AUTO: 1 % (ref 0–1)
BILIRUB SERPL-MCNC: 0.71 MG/DL (ref 0.2–1)
BUN SERPL-MCNC: 14 MG/DL (ref 5–25)
CALCIUM SERPL-MCNC: 9.2 MG/DL (ref 8.3–10.1)
CHLORIDE SERPL-SCNC: 106 MMOL/L (ref 100–108)
CHOLEST SERPL-MCNC: 188 MG/DL
CO2 SERPL-SCNC: 29 MMOL/L (ref 21–32)
CREAT SERPL-MCNC: 0.64 MG/DL (ref 0.6–1.3)
EOSINOPHIL # BLD AUTO: 0.15 THOUSAND/ΜL (ref 0–0.61)
EOSINOPHIL NFR BLD AUTO: 2 % (ref 0–6)
ERYTHROCYTE [DISTWIDTH] IN BLOOD BY AUTOMATED COUNT: 13.9 % (ref 11.6–15.1)
GFR SERPL CREATININE-BSD FRML MDRD: 102 ML/MIN/1.73SQ M
GLUCOSE P FAST SERPL-MCNC: 105 MG/DL (ref 65–99)
HCT VFR BLD AUTO: 42.1 % (ref 34.8–46.1)
HCV AB SER QL: NORMAL
HDLC SERPL-MCNC: 71 MG/DL
HGB BLD-MCNC: 13.9 G/DL (ref 11.5–15.4)
IMM GRANULOCYTES # BLD AUTO: 0.02 THOUSAND/UL (ref 0–0.2)
IMM GRANULOCYTES NFR BLD AUTO: 0 % (ref 0–2)
LDLC SERPL CALC-MCNC: 107 MG/DL (ref 0–100)
LYMPHOCYTES # BLD AUTO: 2.09 THOUSANDS/ΜL (ref 0.6–4.47)
LYMPHOCYTES NFR BLD AUTO: 30 % (ref 14–44)
MCH RBC QN AUTO: 30.5 PG (ref 26.8–34.3)
MCHC RBC AUTO-ENTMCNC: 33 G/DL (ref 31.4–37.4)
MCV RBC AUTO: 92 FL (ref 82–98)
MONOCYTES # BLD AUTO: 0.55 THOUSAND/ΜL (ref 0.17–1.22)
MONOCYTES NFR BLD AUTO: 8 % (ref 4–12)
NEUTROPHILS # BLD AUTO: 4.18 THOUSANDS/ΜL (ref 1.85–7.62)
NEUTS SEG NFR BLD AUTO: 59 % (ref 43–75)
NRBC BLD AUTO-RTO: 0 /100 WBCS
PLATELET # BLD AUTO: 329 THOUSANDS/UL (ref 149–390)
PMV BLD AUTO: 9.7 FL (ref 8.9–12.7)
POTASSIUM SERPL-SCNC: 4.1 MMOL/L (ref 3.5–5.3)
PROT SERPL-MCNC: 7.7 G/DL (ref 6.4–8.2)
RBC # BLD AUTO: 4.56 MILLION/UL (ref 3.81–5.12)
SODIUM SERPL-SCNC: 139 MMOL/L (ref 136–145)
TRIGL SERPL-MCNC: 51 MG/DL
TSH SERPL DL<=0.05 MIU/L-ACNC: 0.72 UIU/ML (ref 0.36–3.74)
WBC # BLD AUTO: 7.03 THOUSAND/UL (ref 4.31–10.16)

## 2022-03-04 PROCEDURE — 36415 COLL VENOUS BLD VENIPUNCTURE: CPT

## 2022-03-04 PROCEDURE — 84443 ASSAY THYROID STIM HORMONE: CPT

## 2022-03-04 PROCEDURE — 85025 COMPLETE CBC W/AUTO DIFF WBC: CPT

## 2022-03-04 PROCEDURE — 86803 HEPATITIS C AB TEST: CPT

## 2022-03-04 PROCEDURE — 80061 LIPID PANEL: CPT

## 2022-03-04 PROCEDURE — 80053 COMPREHEN METABOLIC PANEL: CPT

## 2022-03-11 ENCOUNTER — ANNUAL EXAM (OUTPATIENT)
Dept: GYNECOLOGY | Facility: CLINIC | Age: 52
End: 2022-03-11
Payer: COMMERCIAL

## 2022-03-11 VITALS
SYSTOLIC BLOOD PRESSURE: 110 MMHG | HEART RATE: 86 BPM | WEIGHT: 135.4 LBS | HEIGHT: 62 IN | BODY MASS INDEX: 24.92 KG/M2 | DIASTOLIC BLOOD PRESSURE: 76 MMHG

## 2022-03-11 DIAGNOSIS — Z01.419 ENCOUNTER FOR GYNECOLOGICAL EXAMINATION WITHOUT ABNORMAL FINDING: Primary | ICD-10-CM

## 2022-03-11 DIAGNOSIS — Z12.4 ENCOUNTER FOR PAPANICOLAOU SMEAR FOR CERVICAL CANCER SCREENING: ICD-10-CM

## 2022-03-11 DIAGNOSIS — Z13.820 SCREENING FOR OSTEOPOROSIS: ICD-10-CM

## 2022-03-11 PROCEDURE — S0612 ANNUAL GYNECOLOGICAL EXAMINA: HCPCS | Performed by: OBSTETRICS & GYNECOLOGY

## 2022-03-11 PROCEDURE — G0145 SCR C/V CYTO,THINLAYER,RESCR: HCPCS | Performed by: OBSTETRICS & GYNECOLOGY

## 2022-03-11 PROCEDURE — 3008F BODY MASS INDEX DOCD: CPT | Performed by: DERMATOLOGY

## 2022-03-11 RX ORDER — ZINC GLUCONATE 50 MG
50 TABLET ORAL DAILY
COMMUNITY

## 2022-03-11 RX ORDER — DIPHENOXYLATE HYDROCHLORIDE AND ATROPINE SULFATE 2.5; .025 MG/1; MG/1
1 TABLET ORAL DAILY
COMMUNITY

## 2022-03-11 NOTE — PROGRESS NOTES
Assessment/Plan:         Diagnoses and all orders for this visit:    Encounter for gynecological examination without abnormal finding    Screening for osteoporosis : heel scan: -0 8    Other orders  -     multivitamin (THERAGRAN) TABS; Take 1 tablet by mouth daily  -     Multiple Vitamins-Minerals (ZINC PO); Take by mouth (Patient not taking: Reported on 3/11/2022 )  -     zinc gluconate 50 mg tablet; Take 50 mg by mouth daily        Subjective:      Patient ID: Jie England is a 46 y o  female  HPI patient presents for annual examination  She offers no complaints  She has not had any bleeding since last March  Last March we did a workup for abnormal uterine bleeding  Workup at that time was negative  She denies any vaginal irritation, burning, discharge or bleeding  She is tolerating vasomotor symptoms  Denies any dysuria, hematuria urgency or urinary incontinence  No GI complaints  Colonoscopy February 2021 colon polyps identified:  Advised to repeat in 5 years    The following portions of the patient's history were reviewed and updated as appropriate:   She  has no past medical history on file  She   Patient Active Problem List    Diagnosis Date Noted    Lateral epicondylitis of left elbow 06/26/2020    Cervical intraepithelial neoplasia grade 1 01/07/2014     She  has a past surgical history that includes Cervical cone biopsy  Her family history includes Cervical cancer (age of onset: 28) in her sister; Coronary artery disease in her father; Diabetes in her father; Lymphoma in her father; No Known Problems in her daughter, maternal grandfather, maternal grandmother, paternal aunt, paternal aunt, paternal grandfather, and paternal grandmother; Other in her brother; Squamous cell carcinoma in her mother  She  reports that she has never smoked  She has never used smokeless tobacco  She reports current alcohol use of about 7 0 standard drinks of alcohol per week   She reports that she does not use drugs  Current Outpatient Medications   Medication Sig Dispense Refill    Calcium 500 MG tablet Take 1 tablet by mouth 2 (two) times a day      multivitamin (THERAGRAN) TABS Take 1 tablet by mouth daily      VITAMIN D PO Take 5,000 Units by mouth daily      zinc gluconate 50 mg tablet Take 50 mg by mouth daily      DAILY MULTIPLE VITAMINS/IRON TABS Take 1 tablet by mouth daily (Patient not taking: Reported on 3/11/2022 )      Multiple Vitamins-Minerals (ZINC PO) Take by mouth daily (Patient not taking: Reported on 3/11/2022 )      Multiple Vitamins-Minerals (ZINC PO) Take by mouth (Patient not taking: Reported on 3/11/2022 )       No current facility-administered medications for this visit  Current Outpatient Medications on File Prior to Visit   Medication Sig    Calcium 500 MG tablet Take 1 tablet by mouth 2 (two) times a day    multivitamin (THERAGRAN) TABS Take 1 tablet by mouth daily    VITAMIN D PO Take 5,000 Units by mouth daily    zinc gluconate 50 mg tablet Take 50 mg by mouth daily    DAILY MULTIPLE VITAMINS/IRON TABS Take 1 tablet by mouth daily (Patient not taking: Reported on 3/11/2022 )    Multiple Vitamins-Minerals (ZINC PO) Take by mouth daily (Patient not taking: Reported on 3/11/2022 )    Multiple Vitamins-Minerals (ZINC PO) Take by mouth (Patient not taking: Reported on 3/11/2022 )     No current facility-administered medications on file prior to visit  She has No Known Allergies       Review of Systems   Constitutional: Negative  HENT: Negative for sore throat and trouble swallowing  Gastrointestinal: Negative  Genitourinary: Negative  Objective:      /76   Pulse 86   Ht 5' 1 5" (1 562 m)   Wt 61 4 kg (135 lb 6 4 oz)   LMP 01/11/2021   BMI 25 17 kg/m²          Physical Exam  Vitals reviewed  Constitutional:       Appearance: Normal appearance  Cardiovascular:      Rate and Rhythm: Normal rate and regular rhythm        Pulses: Normal pulses  Heart sounds: Normal heart sounds  No murmur heard  Pulmonary:      Effort: Pulmonary effort is normal  No respiratory distress  Breath sounds: Normal breath sounds  Chest:   Breasts:      Right: No swelling, bleeding, inverted nipple, mass, nipple discharge, skin change, tenderness, axillary adenopathy or supraclavicular adenopathy  Left: No swelling, bleeding, inverted nipple, mass, nipple discharge, skin change, tenderness, axillary adenopathy or supraclavicular adenopathy  Abdominal:      General: There is no distension  Palpations: Abdomen is soft  There is no mass  Tenderness: There is no abdominal tenderness  There is no guarding or rebound  Hernia: No hernia is present  There is no hernia in the left inguinal area or right inguinal area  Genitourinary:     General: Normal vulva  Labia:         Right: No rash, tenderness or lesion  Left: No rash, tenderness or lesion  Vagina: Normal       Cervix: Normal       Uterus: Normal        Adnexa:         Right: No mass, tenderness or fullness  Left: No mass, tenderness or fullness  Musculoskeletal:      Cervical back: Normal range of motion and neck supple  No tenderness  Lymphadenopathy:      Cervical: No cervical adenopathy  Upper Body:      Right upper body: No supraclavicular, axillary or pectoral adenopathy  Left upper body: No supraclavicular, axillary or pectoral adenopathy  Lower Body: No right inguinal adenopathy  No left inguinal adenopathy  Neurological:      Mental Status: She is alert

## 2022-03-17 LAB
LAB AP GYN PRIMARY INTERPRETATION: NORMAL
Lab: NORMAL

## 2022-05-27 ENCOUNTER — HOSPITAL ENCOUNTER (OUTPATIENT)
Dept: MAMMOGRAPHY | Facility: MEDICAL CENTER | Age: 52
Discharge: HOME | End: 2022-05-27
Payer: COMMERCIAL

## 2022-05-27 ENCOUNTER — TELEPHONE (OUTPATIENT)
Dept: FAMILY MEDICINE CLINIC | Facility: CLINIC | Age: 52
End: 2022-05-27

## 2022-05-27 VITALS — HEIGHT: 62 IN | WEIGHT: 135 LBS | BODY MASS INDEX: 24.84 KG/M2

## 2022-05-27 DIAGNOSIS — Z12.31 ENCOUNTER FOR SCREENING MAMMOGRAM FOR BREAST CANCER: ICD-10-CM

## 2022-05-27 PROCEDURE — 77067 SCR MAMMO BI INCL CAD: CPT

## 2022-05-27 PROCEDURE — 77063 BREAST TOMOSYNTHESIS BI: CPT

## 2022-12-21 ENCOUNTER — OFFICE VISIT (OUTPATIENT)
Dept: FAMILY MEDICINE CLINIC | Facility: CLINIC | Age: 52
End: 2022-12-21

## 2022-12-21 VITALS
DIASTOLIC BLOOD PRESSURE: 64 MMHG | HEART RATE: 88 BPM | BODY MASS INDEX: 25.14 KG/M2 | TEMPERATURE: 96 F | OXYGEN SATURATION: 96 % | WEIGHT: 136.6 LBS | RESPIRATION RATE: 16 BRPM | HEIGHT: 62 IN | SYSTOLIC BLOOD PRESSURE: 116 MMHG

## 2022-12-21 DIAGNOSIS — H69.81 DYSFUNCTION OF RIGHT EUSTACHIAN TUBE: Primary | ICD-10-CM

## 2022-12-21 RX ORDER — LANOLIN ALCOHOL/MO/W.PET/CERES
CREAM (GRAM) TOPICAL
COMMUNITY

## 2022-12-21 RX ORDER — ANTIARTHRITIC COMBINATION NO.2 900 MG
TABLET ORAL
COMMUNITY

## 2022-12-21 RX ORDER — MULTIVIT WITH MINERALS/LUTEIN
TABLET ORAL
COMMUNITY

## 2022-12-21 RX ORDER — ZINC GLUCONATE 50 MG
TABLET ORAL
COMMUNITY

## 2022-12-21 NOTE — PROGRESS NOTES
FAMILY PRACTICE OFFICE VISIT    NAME: Nicky Herr    AGE: 46 y o  SEX: female  : 1970   MRN: [id number]    DATE: 2022  TIME: 4:34 PM    Assessment and Plan     1  Dysfunction of right eustachian tube  Trial of flonase  Can also use nedi-pot  Call if not improving    Patient Instructions   Flonase - 1 spray into each nostril bid for 2 weeks            Chief Complaint     Chief Complaint   Patient presents with   • Ear Fullness     Started  22 with congestion  Feeling better but still has Muffled hearing  in right ear, but not as bad as it was  History of Present Illness   Nicky Herr is a 46y o -year-old female who presents today for followup on ears    Began initially not feeling well after thanksgiving  Missed some work  Took amoxicillin  Ears remained full - and have slowly improved  But pt would like right ear rechecked  Completed the full course of antibiotics  Pt was not tested for flu or covid at time of above illness  Not taking anything for ear at present time    Had flu shot        Review of Systems   Review of Systems   Constitutional: Negative for fever  HENT: Negative for congestion, ear discharge and ear pain  Right ear fullness - but no pain  No discharge     Respiratory: Negative for cough, shortness of breath and wheezing  Cardiovascular: Negative for chest pain and palpitations  Active Problem List     Patient Active Problem List   Diagnosis   • Cervical intraepithelial neoplasia grade 1   • Lateral epicondylitis of left elbow         Past Medical History:  History reviewed  No pertinent past medical history      Past Surgical History:  Past Surgical History:   Procedure Laterality Date   • CERVICAL CONE BIOPSY         Family History:  Family History   Problem Relation Age of Onset   • Diabetes Father    • Coronary artery disease Father         4 stents   • Lymphoma Father         non-Hodgkin's   • Squamous cell carcinoma Mother    • Cervical cancer Sister 28   • Other Brother         pituitary tumor   • No Known Problems Maternal Grandmother    • No Known Problems Maternal Grandfather    • No Known Problems Paternal Grandmother    • No Known Problems Paternal Grandfather    • No Known Problems Daughter    • No Known Problems Paternal Aunt    • No Known Problems Paternal Aunt        Social History:  Social History     Socioeconomic History   • Marital status: /Civil Union     Spouse name: Not on file   • Number of children: Not on file   • Years of education: Not on file   • Highest education level: Not on file   Occupational History   • Not on file   Tobacco Use   • Smoking status: Never   • Smokeless tobacco: Never   Vaping Use   • Vaping Use: Never used   Substance and Sexual Activity   • Alcohol use: Yes     Alcohol/week: 7 0 standard drinks     Types: 7 Glasses of wine per week     Comment: 1-2 glasses of wine a few times a week   • Drug use: Never   • Sexual activity: Yes     Partners: Male     Birth control/protection: None, Post-menopausal   Other Topics Concern   • Not on file   Social History Narrative   • Not on file     Social Determinants of Health     Financial Resource Strain: Not on file   Food Insecurity: Not on file   Transportation Needs: Not on file   Physical Activity: Not on file   Stress: Not on file   Social Connections: Not on file   Intimate Partner Violence: Not on file   Housing Stability: Not on file       Objective     Vitals:    12/21/22 1633   BP:    Pulse: 88   Resp:    Temp:    SpO2:      Wt Readings from Last 3 Encounters:   12/21/22 62 kg (136 lb 9 6 oz)   05/27/22 61 2 kg (135 lb)   03/11/22 61 4 kg (135 lb 6 4 oz)       Physical Exam  Vitals and nursing note reviewed  Constitutional:       General: She is not in acute distress  Appearance: Normal appearance  She is not ill-appearing or toxic-appearing  HENT:      Right Ear: Tympanic membrane normal  There is no impacted cerumen        Left Ear: Tympanic membrane normal  There is no impacted cerumen  Ears:      Comments: Tm's - good light reflexes         Nose: Congestion present  Comments: Right nares - slightly congested     Mouth/Throat:      Mouth: Mucous membranes are moist       Pharynx: No oropharyngeal exudate or posterior oropharyngeal erythema  Eyes:      General: No scleral icterus  Neck:      Vascular: No carotid bruit  Cardiovascular:      Rate and Rhythm: Normal rate and regular rhythm  Pulses: Normal pulses  Heart sounds: Normal heart sounds  No murmur heard  Pulmonary:      Effort: Pulmonary effort is normal  No respiratory distress  Breath sounds: Normal breath sounds  No wheezing or rhonchi  Musculoskeletal:      Cervical back: Neck supple  Lymphadenopathy:      Cervical: No cervical adenopathy  Neurological:      General: No focal deficit present  Mental Status: She is alert and oriented to person, place, and time  Psychiatric:         Mood and Affect: Mood normal          Behavior: Behavior normal          Thought Content:  Thought content normal          Judgment: Judgment normal          Pertinent Laboratory/Diagnostic Studies:  Lab Results   Component Value Date    GLUCOSE 98 02/13/2015    BUN 14 03/04/2022    CREATININE 0 64 03/04/2022    CALCIUM 9 2 03/04/2022     02/13/2015    K 4 1 03/04/2022    CO2 29 03/04/2022     03/04/2022     Lab Results   Component Value Date    ALT 30 03/04/2022    AST 16 03/04/2022    ALKPHOS 60 03/04/2022    BILITOT 0 82 02/13/2015       Lab Results   Component Value Date    WBC 7 03 03/04/2022    HGB 13 9 03/04/2022    HCT 42 1 03/04/2022    MCV 92 03/04/2022     03/04/2022       No results found for: TSH    Lab Results   Component Value Date    CHOL 133 02/13/2015     Lab Results   Component Value Date    TRIG 51 03/04/2022     Lab Results   Component Value Date    HDL 71 03/04/2022     Lab Results   Component Value Date    LDLCALC 107 (H) 03/04/2022     Lab Results   Component Value Date    HGBA1C 5 6 03/04/2022       Results for orders placed or performed in visit on 03/11/22   Liquid-based pap, screening   Result Value Ref Range    Case Report       Gynecologic Cytology Report                       Case: [id number]                                  Authorizing Provider:  Poli Chase DO      Collected:           03/11/2022 0935              Ordering Location:     [address] [place] [address] For        Received:            03/11/2022 0935                                     [facility]                                                    First Screen:          Jyotsna Lebron                                                               Specimen:    LIQUID-BASED PAP, SCREENING, Cervix                                                        Primary Interpretation Negative for intraepithelial lesion or malignancy     Specimen Adequacy       Satisfactory for evaluation  Endocervical/transformation zone component present  Additional Information       [employer]'s FDA approved ,  and ThinPrep Imaging Duo System are utilized with strict adherence to the 's instruction manual to prepare gynecologic and non-gynecologic cytology specimens for the production of ThinPrep slides as well as for gynecologic ThinPrep imaging  These processes have been validated by our laboratory and/or by the   The Pap test is not a diagnostic procedure and should not be used as the sole means to detect cervical cancer  It is only a screening procedure to aid in the detection of cervical cancer and its precursors  Both false-negative and false-positive results have been experienced  Your patient's test result should be interpreted in this context together with the history and clinical findings  No orders of the defined types were placed in this encounter        ALLERGIES:  No Known Allergies    Current Medications     Current Outpatient Medications   Medication Sig Dispense Refill   • Ascorbic Acid (vitamin C) 1000 MG tablet      • Biotin 5000 MCG TABS      • Calcium-Magnesium-Vitamin D (CALCIUM 1200+D3 PO)      • multivitamin (THERAGRAN) TABS Take 1 tablet by mouth daily     • vitamin B-12 (VITAMIN B-12) 1,000 mcg tablet      • VITAMIN D PO Take 5,000 Units by mouth daily     • Zinc 50 MG TABS      • Calcium 500 MG tablet Take 1 tablet by mouth 2 (two) times a day (Patient not taking: Reported on 12/21/2022)     • DAILY MULTIPLE VITAMINS/IRON TABS Take 1 tablet by mouth daily (Patient not taking: Reported on 12/21/2022)     • Multiple Vitamins-Minerals (ZINC PO) Take by mouth daily (Patient not taking: Reported on 3/11/2022)     • Multiple Vitamins-Minerals (ZINC PO) Take by mouth (Patient not taking: Reported on 3/11/2022)     • zinc gluconate 50 mg tablet Take 50 mg by mouth daily (Patient not taking: Reported on 12/21/2022)       No current facility-administered medications for this visit           Health Maintenance     Health Maintenance   Topic Date Due   • Hepatitis B Vaccine (1 of 3 - 3-dose series) Never done   • COVID-19 Vaccine (1) Never done   • PT PLAN OF CARE  09/24/2020   • Depression Screening  02/04/2023   • Annual Physical  02/04/2023   • Breast Cancer Screening: Mammogram  05/27/2023   • BMI: Adult  12/21/2023   • Cervical Cancer Screening  03/11/2025   • Colorectal Cancer Screening  02/26/2026   • DTaP,Tdap,and Td Vaccines (2 - Td or Tdap) 01/24/2030   • HIV Screening  Completed   • Hepatitis C Screening  Completed   • Influenza Vaccine  Completed   • Pneumococcal Vaccine: Pediatrics (0 to 5 Years) and At-Risk Patients (6 to 59 Years)  Aged Out   • HIB Vaccine  Aged Out   • IPV Vaccine  Aged Out   • Hepatitis A Vaccine  Aged Out   • Meningococcal ACWY Vaccine  Aged Out   • HPV Vaccine  Aged [employer] History   Administered Date(s) Administered   • INFLUENZA 10/08/2014, 10/14/2014, 10/06/2015, 10/18/2016, 10/13/2017, 10/24/2017, 10/02/2019, 10/04/2022   • Influenza Quadrivalent Preservative Free 3 years and older IM 10/13/2017   • Influenza, injectable, quadrivalent, preservative free 0 5 mL 10/23/2020   • Tdap 01/24/2020   • Zoster Vaccine Recombinant 01/29/2021, 03/26/2021          Kelvin Wynne DO

## 2022-12-23 ENCOUNTER — TELEPHONE (OUTPATIENT)
Dept: FAMILY MEDICINE CLINIC | Facility: CLINIC | Age: 52
End: 2022-12-23

## 2022-12-23 DIAGNOSIS — H69.81 EUSTACHIAN TUBE DYSFUNCTION, RIGHT: Primary | ICD-10-CM

## 2022-12-23 RX ORDER — METHYLPREDNISOLONE 4 MG/1
TABLET ORAL
Qty: 21 EACH | Refills: 0 | Status: SHIPPED | OUTPATIENT
Start: 2022-12-23

## 2022-12-23 NOTE — TELEPHONE ENCOUNTER
Date/Time: 12-23-22 / 12:06 PM    Pt called in inquiring about her ear hurting and if Dr Becky Gooden recommended anything for her  I relayed Dr Becky Gooden note to her, and she responded with understanding

## 2022-12-23 NOTE — TELEPHONE ENCOUNTER
Patient called reporting redness of R ear, fullness, no improvements  Denies pain in R ear  Update from Visit 12/21/22  Patient is asking for ear drop antibiotic

## 2022-12-23 NOTE — TELEPHONE ENCOUNTER
Please call pt    Based on appearance of ear on exam - I do not believe that an ear drop will work  I will send in rx for oral prednisone  Confirm no h/o stomach ulcers or bleeds  And take with food  Avoid taking with otc nsaids or asa  And can continue with nasal sprays

## 2023-02-17 ENCOUNTER — OFFICE VISIT (OUTPATIENT)
Dept: FAMILY MEDICINE CLINIC | Facility: CLINIC | Age: 53
End: 2023-02-17

## 2023-02-17 VITALS
HEIGHT: 62 IN | TEMPERATURE: 98 F | SYSTOLIC BLOOD PRESSURE: 110 MMHG | WEIGHT: 137.8 LBS | DIASTOLIC BLOOD PRESSURE: 72 MMHG | BODY MASS INDEX: 25.36 KG/M2 | RESPIRATION RATE: 16 BRPM | HEART RATE: 79 BPM | OXYGEN SATURATION: 97 %

## 2023-02-17 DIAGNOSIS — Z00.00 ANNUAL PHYSICAL EXAM: Primary | ICD-10-CM

## 2023-02-17 DIAGNOSIS — M25.531 RIGHT WRIST PAIN: ICD-10-CM

## 2023-02-17 DIAGNOSIS — R73.01 IFG (IMPAIRED FASTING GLUCOSE): ICD-10-CM

## 2023-02-17 DIAGNOSIS — Z13.220 LIPID SCREENING: ICD-10-CM

## 2023-02-17 NOTE — PROGRESS NOTES
ADULT ANNUAL [facility]    NAME: Davina Torres  AGE: 48 y o  SEX: female  : 1970     DATE: 2023     Assessment and Plan:     Problem List Items Addressed This Visit        Endocrine    IFG (impaired fasting glucose)     Limit carb intake  Recheck with labs as ordered  Relevant Orders    CBC and differential    Comprehensive metabolic panel    Hemoglobin A1C    TSH, 3rd generation with Free T4 reflex       Other    Right wrist pain     Improving  Continue wrist splint  Check x-ray given additional bony protrusion noted in the right wrist compared to the left  Call if worsens or persists  Relevant Orders    XR wrist 3+ vw right   Other Visit Diagnoses     Annual physical exam    -  Primary    Relevant Orders    CBC and differential    Comprehensive metabolic panel    Hemoglobin A1C    Lipid Panel with Direct LDL reflex    TSH, 3rd generation with Free T4 reflex    Lipid screening        Relevant Orders    Lipid Panel with Direct LDL reflex          Immunizations and preventive care screenings were discussed with patient today  Appropriate education was printed on patient's after visit summary  Counseling:  · Exercise: the importance of regular exercise/physical activity was discussed  Recommend exercise 3-5 times per week for at least 30 minutes  Return in about 1 year (around 2024) for Annual physical      Chief Complaint:     Chief Complaint   Patient presents with   • Physical Exam      History of Present Illness:     Adult Annual Physical   Patient here for a comprehensive physical exam  The patient reports problems - as below  Has been having pain in the right wrist for the last 3 weeks - notes that she was re-potting  She has been using a wrist brace that seems to be helpful           Diet and Physical Activity  · Diet/Nutrition: well balanced diet and consuming 3-5 servings of fruits/vegetables daily  · Exercise: walks or does workout videos 4-5 times a week  Depression Screening  PHQ-2/9 Depression Screening    Little interest or pleasure in doing things: 0 - not at all  Feeling down, depressed, or hopeless: 0 - not at all  Trouble falling or staying asleep, or sleeping too much: 0 - not at all  Feeling tired or having little energy: 0 - not at all  Poor appetite or overeatin - not at all  Feeling bad about yourself - or that you are a failure or have let yourself or your family down: 0 - not at all  Trouble concentrating on things, such as reading the newspaper or watching television: 0 - not at all  Moving or speaking so slowly that other people could have noticed  Or the opposite - being so fidgety or restless that you have been moving around a lot more than usual: 0 - not at all  Thoughts that you would be better off dead, or of hurting yourself in some way: 0 - not at all  PHQ-2 Score: 0  PHQ-2 Interpretation: Negative depression screen  PHQ-9 Score: 0   PHQ-9 Interpretation: No or Minimal depression        General Health  · Sleep: sleeps well and gets 7-8 hours of sleep on average  She has some interruptions  · Hearing: normal - bilateral   · Vision: goes for regular eye exams and wears glasses  · Dental: regular dental visits  /GYN Health  · Patient is: postmenopausal  · Last menstrual period: over a year ago       Review of Systems:     Review of Systems   Constitutional: Negative for chills and fever  HENT: Negative for rhinorrhea and sore throat  Eyes: Negative for visual disturbance  Respiratory: Negative for cough, shortness of breath and wheezing  Cardiovascular: Negative for chest pain, palpitations and leg swelling  Gastrointestinal: Negative for abdominal pain, blood in stool, constipation, diarrhea, nausea and vomiting  Endocrine: Negative for polydipsia and polyuria  Genitourinary: Negative for dysuria and frequency  Musculoskeletal: Positive for arthralgias  Negative for myalgias  Skin: Negative for rash  Neurological: Negative for dizziness, syncope and headaches  Hematological: Does not bruise/bleed easily  Psychiatric/Behavioral: Negative for dysphoric mood  The patient is not nervous/anxious  Past Medical History:     History reviewed  No pertinent past medical history  Past Surgical History:     Past Surgical History:   Procedure Laterality Date   • CERVICAL CONE BIOPSY        Social History:     Social History     Socioeconomic History   • Marital status: /Civil Union     Spouse name: None   • Number of children: None   • Years of education: None   • Highest education level: None   Occupational History   • None   Tobacco Use   • Smoking status: Never   • Smokeless tobacco: Never   Vaping Use   • Vaping Use: Never used   Substance and Sexual Activity   • Alcohol use:  Yes     Alcohol/week: 7 0 standard drinks     Types: 7 Glasses of wine per week     Comment: 1-2 glasses of wine a few times a week   • Drug use: Never   • Sexual activity: Yes     Partners: Male     Birth control/protection: None, Post-menopausal   Other Topics Concern   • None   Social History Narrative   • None     Social Determinants of Health     Financial Resource Strain: Not on file   Food Insecurity: Not on file   Transportation Needs: Not on file   Physical Activity: Not on file   Stress: Not on file   Social Connections: Not on file   Intimate Partner Violence: Not on file   Housing Stability: Not on file      Family History:     Family History   Problem Relation Age of Onset   • Squamous cell carcinoma Mother    • Other Mother         spinal stenosis   • Diabetes Father    • Coronary artery disease Father         4 stents   • Lymphoma Father         non-Hodgkin's   • Cervical cancer Sister 28   • Other Brother         pituitary tumor   • No Known Problems Daughter    • No Known Problems Maternal Grandmother    • No Known Problems Maternal Grandfather    • No Known Problems Paternal Grandmother    • No Known Problems Paternal Grandfather    • No Known Problems Paternal Aunt    • No Known Problems Paternal Aunt       Current Medications:     Current Outpatient Medications   Medication Sig Dispense Refill   • Ascorbic Acid (vitamin C) 1000 MG tablet Take 1 tablet by mouth in the morning     • Biotin 5000 MCG TABS      • Calcium 500 MG tablet Take 1 tablet by mouth 2 (two) times a day     • DAILY MULTIPLE VITAMINS/IRON TABS Take 1 tablet by mouth daily     • multivitamin (THERAGRAN) TABS Take 1 tablet by mouth daily     • vitamin B-12 (VITAMIN B-12) 1,000 mcg tablet      • VITAMIN D PO Take 5,000 Units by mouth daily     • zinc gluconate 50 mg tablet Take 50 mg by mouth daily       No current facility-administered medications for this visit  Allergies:     No Known Allergies   Physical Exam:     /72 (BP Location: Left arm, Cuff Size: Standard)   Pulse 79   Temp 98 °F (36 7 °C) (Tympanic)   Resp 16   Ht 5' 2" (1 575 m)   Wt 62 5 kg (137 lb 12 8 oz)   LMP 01/11/2021   SpO2 97%   BMI 25 20 kg/m²     Physical Exam  Vitals reviewed  Constitutional:       General: She is not in acute distress  Appearance: Normal appearance  She is well-developed  She is not ill-appearing  HENT:      Head: Normocephalic and atraumatic  Right Ear: Tympanic membrane, ear canal and external ear normal       Left Ear: Tympanic membrane, ear canal and external ear normal       Nose: Nose normal  No congestion  Mouth/Throat:      Mouth: Mucous membranes are moist       Pharynx: No oropharyngeal exudate  Eyes:      Conjunctiva/sclera: Conjunctivae normal       Pupils: Pupils are equal, round, and reactive to light  Neck:      Thyroid: No thyromegaly  Vascular: No carotid bruit  Cardiovascular:      Rate and Rhythm: Normal rate and regular rhythm  Pulses: Normal pulses  Heart sounds: Normal heart sounds   No murmur heard  Pulmonary:      Effort: Pulmonary effort is normal       Breath sounds: Normal breath sounds  No wheezing or rales  Abdominal:      General: Bowel sounds are normal  There is no distension  Palpations: Abdomen is soft  There is no mass  Tenderness: There is no abdominal tenderness  Musculoskeletal:         General: Tenderness (An area of extra bony prominence noted in the ulnar right wrist) and deformity (Additional bony protrusion noted over the right ulnar wrist ) present  Cervical back: Normal range of motion and neck supple  Right lower leg: No edema  Left lower leg: No edema  Lymphadenopathy:      Cervical: No cervical adenopathy  Skin:     General: Skin is warm and dry  Findings: No bruising, erythema or rash  Neurological:      Mental Status: She is alert  Sensory: No sensory deficit  Motor: No weakness  Comments: 5/5 strength in UE and LE   Psychiatric:         Mood and Affect: Mood normal          Behavior: Behavior normal          Thought Content:  Thought content normal          Judgment: Judgment normal           Sorin Pena PA-C  [facility]

## 2023-02-19 PROBLEM — R73.01 IFG (IMPAIRED FASTING GLUCOSE): Status: ACTIVE | Noted: 2023-02-19

## 2023-02-19 PROBLEM — M25.531 RIGHT WRIST PAIN: Status: ACTIVE | Noted: 2023-02-19

## 2023-02-19 NOTE — ASSESSMENT & PLAN NOTE
Improving  Continue wrist splint  Check x-ray given additional bony protrusion noted in the right wrist compared to the left  Call if worsens or persists

## 2023-03-10 ENCOUNTER — APPOINTMENT (OUTPATIENT)
Dept: LAB | Facility: MEDICAL CENTER | Age: 53
End: 2023-03-10

## 2023-03-10 DIAGNOSIS — Z00.00 ANNUAL PHYSICAL EXAM: ICD-10-CM

## 2023-03-10 DIAGNOSIS — R73.01 IFG (IMPAIRED FASTING GLUCOSE): ICD-10-CM

## 2023-03-10 DIAGNOSIS — Z13.220 LIPID SCREENING: ICD-10-CM

## 2023-03-10 LAB
ALBUMIN SERPL BCP-MCNC: 4.4 G/DL (ref 3.5–5)
ALP SERPL-CCNC: 57 U/L (ref 46–116)
ALT SERPL W P-5'-P-CCNC: 32 U/L (ref 12–78)
ANION GAP SERPL CALCULATED.3IONS-SCNC: 7 MMOL/L (ref 4–13)
AST SERPL W P-5'-P-CCNC: 18 U/L (ref 5–45)
BASOPHILS # BLD AUTO: 0.02 THOUSANDS/ÂΜL (ref 0–0.1)
BASOPHILS NFR BLD AUTO: 0 % (ref 0–1)
BILIRUB SERPL-MCNC: 0.59 MG/DL (ref 0.2–1)
BUN SERPL-MCNC: 12 MG/DL (ref 5–25)
CALCIUM SERPL-MCNC: 9.6 MG/DL (ref 8.3–10.1)
CHLORIDE SERPL-SCNC: 103 MMOL/L (ref 96–108)
CHOLEST SERPL-MCNC: 186 MG/DL
CO2 SERPL-SCNC: 27 MMOL/L (ref 21–32)
CREAT SERPL-MCNC: 0.58 MG/DL (ref 0.6–1.3)
EOSINOPHIL # BLD AUTO: 0.12 THOUSAND/ÂΜL (ref 0–0.61)
EOSINOPHIL NFR BLD AUTO: 2 % (ref 0–6)
ERYTHROCYTE [DISTWIDTH] IN BLOOD BY AUTOMATED COUNT: 13.8 % (ref 11.6–15.1)
EST. AVERAGE GLUCOSE BLD GHB EST-MCNC: 105 MG/DL
GFR SERPL CREATININE-BSD FRML MDRD: 105 ML/MIN/1.73SQ M
GLUCOSE P FAST SERPL-MCNC: 98 MG/DL (ref 65–99)
HBA1C MFR BLD: 5.3 %
HCT VFR BLD AUTO: 43.9 % (ref 34.8–46.1)
HDLC SERPL-MCNC: 67 MG/DL
HGB BLD-MCNC: 14.4 G/DL (ref 11.5–15.4)
IMM GRANULOCYTES # BLD AUTO: 0.01 THOUSAND/UL (ref 0–0.2)
IMM GRANULOCYTES NFR BLD AUTO: 0 % (ref 0–2)
LDLC SERPL CALC-MCNC: 109 MG/DL (ref 0–100)
LYMPHOCYTES # BLD AUTO: 1.98 THOUSANDS/ÂΜL (ref 0.6–4.47)
LYMPHOCYTES NFR BLD AUTO: 37 % (ref 14–44)
MCH RBC QN AUTO: 30.2 PG (ref 26.8–34.3)
MCHC RBC AUTO-ENTMCNC: 32.8 G/DL (ref 31.4–37.4)
MCV RBC AUTO: 92 FL (ref 82–98)
MONOCYTES # BLD AUTO: 0.51 THOUSAND/ÂΜL (ref 0.17–1.22)
MONOCYTES NFR BLD AUTO: 10 % (ref 4–12)
NEUTROPHILS # BLD AUTO: 2.73 THOUSANDS/ÂΜL (ref 1.85–7.62)
NEUTS SEG NFR BLD AUTO: 51 % (ref 43–75)
NRBC BLD AUTO-RTO: 0 /100 WBCS
PLATELET # BLD AUTO: 341 THOUSANDS/UL (ref 149–390)
PMV BLD AUTO: 9.6 FL (ref 8.9–12.7)
POTASSIUM SERPL-SCNC: 4.7 MMOL/L (ref 3.5–5.3)
PROT SERPL-MCNC: 7.3 G/DL (ref 6.4–8.4)
RBC # BLD AUTO: 4.77 MILLION/UL (ref 3.81–5.12)
SODIUM SERPL-SCNC: 137 MMOL/L (ref 135–147)
TRIGL SERPL-MCNC: 50 MG/DL
TSH SERPL DL<=0.05 MIU/L-ACNC: 0.9 UIU/ML (ref 0.45–4.5)
WBC # BLD AUTO: 5.37 THOUSAND/UL (ref 4.31–10.16)

## 2023-03-17 ENCOUNTER — ANNUAL EXAM (OUTPATIENT)
Dept: GYNECOLOGY | Facility: CLINIC | Age: 53
End: 2023-03-17

## 2023-03-17 VITALS
BODY MASS INDEX: 25.4 KG/M2 | HEIGHT: 62 IN | HEART RATE: 78 BPM | DIASTOLIC BLOOD PRESSURE: 60 MMHG | WEIGHT: 138 LBS | SYSTOLIC BLOOD PRESSURE: 110 MMHG

## 2023-03-17 DIAGNOSIS — N95.2 ATROPHIC VAGINITIS: ICD-10-CM

## 2023-03-17 DIAGNOSIS — Z01.419 ENCOUNTER FOR GYNECOLOGICAL EXAMINATION WITHOUT ABNORMAL FINDING: Primary | ICD-10-CM

## 2023-03-17 NOTE — PROGRESS NOTES
Assessment/Plan:         Diagnoses and all orders for this visit:    Encounter for gynecological examination without abnormal finding    Atrophic vaginitis        Subjective:      Patient ID: Gale Klein is a 48 y o  female  HPI patient presents for annual examination  She offers no complaints  Tolerating vasomotor symptoms  Denies any vaginal irritation, burning, discharge or bleeding  Denies any dysuria, hematuria urgency or urinary incontinence  No GI complaints     : Colonoscopy February 2021  Polyps identified  Advise repeat in 5 years    Osteoporosis screening via peripheral scan March 2022   -0 8    The following portions of the patient's history were reviewed and updated as appropriate:   She  has no past medical history on file  She   Patient Active Problem List    Diagnosis Date Noted   • IFG (impaired fasting glucose) 02/19/2023   • Right wrist pain 02/19/2023   • Lateral epicondylitis of left elbow 06/26/2020   • Cervical intraepithelial neoplasia grade 1 01/07/2014     She  has a past surgical history that includes Cervical cone biopsy  Her family history includes Cervical cancer (age of onset: 28) in her sister; Coronary artery disease in her father; Diabetes in her father; Lymphoma in her father; No Known Problems in her daughter, maternal grandfather, maternal grandmother, paternal aunt, paternal aunt, paternal grandfather, and paternal grandmother; Other in her brother and mother; Squamous cell carcinoma in her mother  She  reports that she has never smoked  She has never used smokeless tobacco  She reports current alcohol use of about 7 0 standard drinks per week  She reports that she does not use drugs    Current Outpatient Medications   Medication Sig Dispense Refill   • Ascorbic Acid (vitamin C) 1000 MG tablet Take 1 tablet by mouth in the morning     • Biotin 5000 MCG TABS      • Calcium 500 MG tablet Take 1 tablet by mouth 2 (two) times a day     • multivitamin [facility]) TABS Take 1 tablet by mouth daily     • vitamin B-12 (VITAMIN B-12) 1,000 mcg tablet      • VITAMIN D PO Take 5,000 Units by mouth daily     • zinc gluconate 50 mg tablet Take 50 mg by mouth daily     • DAILY MULTIPLE VITAMINS/IRON TABS Take 1 tablet by mouth daily (Patient not taking: Reported on 3/17/2023)       No current facility-administered medications for this visit  Current Outpatient Medications on File Prior to Visit   Medication Sig   • Ascorbic Acid (vitamin C) 1000 MG tablet Take 1 tablet by mouth in the morning   • Biotin 5000 MCG TABS    • Calcium 500 MG tablet Take 1 tablet by mouth 2 (two) times a day   • multivitamin (THERAGRAN) TABS Take 1 tablet by mouth daily   • vitamin B-12 (VITAMIN B-12) 1,000 mcg tablet    • VITAMIN D PO Take 5,000 Units by mouth daily   • zinc gluconate 50 mg tablet Take 50 mg by mouth daily   • DAILY MULTIPLE VITAMINS/IRON TABS Take 1 tablet by mouth daily (Patient not taking: Reported on 3/17/2023)     No current facility-administered medications on file prior to visit  She has No Known Allergies       Review of Systems   Constitutional: Negative  HENT: Negative for sore throat and trouble swallowing  Gastrointestinal: Negative  Genitourinary: Negative  Objective:      /60   Pulse 78   Ht 5' 2" (1 575 m)   Wt 62 6 kg (138 lb)   LMP 01/11/2021   BMI 25 24 kg/m²          Physical Exam  Vitals reviewed  Constitutional:       Appearance: Normal appearance  Cardiovascular:      Rate and Rhythm: Normal rate and regular rhythm  Pulses: Normal pulses  Heart sounds: Normal heart sounds  No murmur heard  Pulmonary:      Effort: Pulmonary effort is normal  No respiratory distress  Breath sounds: Normal breath sounds  Chest:   Breasts:     Right: No swelling, bleeding, inverted nipple, mass, nipple discharge, skin change or tenderness        Left: No swelling, bleeding, inverted nipple, mass, nipple discharge, skin change or tenderness  Abdominal:      General: There is no distension  Palpations: Abdomen is soft  There is no mass  Tenderness: There is no abdominal tenderness  There is no guarding or rebound  Hernia: No hernia is present  There is no hernia in the left inguinal area or right inguinal area  Genitourinary:     General: Normal vulva  Labia:         Right: No rash, tenderness or lesion  Left: No rash, tenderness or lesion  Vagina: Normal       Cervix: Normal       Uterus: Normal        Adnexa:         Right: No mass, tenderness or fullness  Left: No mass, tenderness or fullness  Musculoskeletal:      Cervical back: Normal range of motion and neck supple  No tenderness  Lymphadenopathy:      Cervical: No cervical adenopathy  Upper Body:      Right upper body: No supraclavicular, axillary or pectoral adenopathy  Left upper body: No supraclavicular, axillary or pectoral adenopathy  Lower Body: No right inguinal adenopathy  No left inguinal adenopathy  Neurological:      Mental Status: She is alert

## 2023-03-28 LAB
LAB AP GYN PRIMARY INTERPRETATION: NORMAL
Lab: NORMAL

## 2023-06-02 ENCOUNTER — HOSPITAL ENCOUNTER (OUTPATIENT)
Dept: MAMMOGRAPHY | Facility: MEDICAL CENTER | Age: 53
Discharge: HOME | End: 2023-06-02

## 2023-06-02 VITALS — WEIGHT: 138.01 LBS | HEIGHT: 62 IN | BODY MASS INDEX: 25.4 KG/M2

## 2023-06-02 DIAGNOSIS — Z12.31 VISIT FOR SCREENING MAMMOGRAM: ICD-10-CM

## 2024-01-06 ENCOUNTER — TELEPHONE (OUTPATIENT)
Dept: OTHER | Facility: OTHER | Age: 54
End: 2024-01-06

## 2024-03-01 ENCOUNTER — OFFICE VISIT (OUTPATIENT)
Dept: FAMILY MEDICINE CLINIC | Facility: CLINIC | Age: 54
End: 2024-03-01
Payer: COMMERCIAL

## 2024-03-01 ENCOUNTER — APPOINTMENT (OUTPATIENT)
Dept: LAB | Facility: MEDICAL CENTER | Age: 54
End: 2024-03-01
Payer: COMMERCIAL

## 2024-03-01 VITALS
HEART RATE: 72 BPM | TEMPERATURE: 96.8 F | BODY MASS INDEX: 26.09 KG/M2 | SYSTOLIC BLOOD PRESSURE: 110 MMHG | HEIGHT: 62 IN | OXYGEN SATURATION: 100 % | WEIGHT: 141.8 LBS | DIASTOLIC BLOOD PRESSURE: 62 MMHG

## 2024-03-01 DIAGNOSIS — Z13.220 LIPID SCREENING: ICD-10-CM

## 2024-03-01 DIAGNOSIS — Z78.9 TAKES DIETARY SUPPLEMENTS: ICD-10-CM

## 2024-03-01 DIAGNOSIS — E66.3 OVERWEIGHT: ICD-10-CM

## 2024-03-01 DIAGNOSIS — R73.01 IFG (IMPAIRED FASTING GLUCOSE): ICD-10-CM

## 2024-03-01 DIAGNOSIS — Z00.00 ANNUAL PHYSICAL EXAM: Primary | ICD-10-CM

## 2024-03-01 LAB
25(OH)D3 SERPL-MCNC: 85.9 NG/ML (ref 30–100)
ALBUMIN SERPL BCP-MCNC: 4.9 G/DL (ref 3.5–5)
ALP SERPL-CCNC: 59 U/L (ref 34–104)
ALT SERPL W P-5'-P-CCNC: 25 U/L (ref 7–52)
ANION GAP SERPL CALCULATED.3IONS-SCNC: 18 MMOL/L
AST SERPL W P-5'-P-CCNC: 23 U/L (ref 13–39)
BASOPHILS # BLD AUTO: 0.04 THOUSANDS/ÂΜL (ref 0–0.1)
BASOPHILS NFR BLD AUTO: 1 % (ref 0–1)
BILIRUB SERPL-MCNC: 0.64 MG/DL (ref 0.2–1)
BUN SERPL-MCNC: 16 MG/DL (ref 5–25)
CALCIUM SERPL-MCNC: 9.6 MG/DL (ref 8.4–10.2)
CHLORIDE SERPL-SCNC: 109 MMOL/L (ref 96–108)
CHOLEST SERPL-MCNC: 195 MG/DL
CO2 SERPL-SCNC: 22 MMOL/L (ref 21–32)
CREAT SERPL-MCNC: 0.62 MG/DL (ref 0.6–1.3)
EOSINOPHIL # BLD AUTO: 0.13 THOUSAND/ÂΜL (ref 0–0.61)
EOSINOPHIL NFR BLD AUTO: 3 % (ref 0–6)
ERYTHROCYTE [DISTWIDTH] IN BLOOD BY AUTOMATED COUNT: 13.6 % (ref 11.6–15.1)
EST. AVERAGE GLUCOSE BLD GHB EST-MCNC: 126 MG/DL
GFR SERPL CREATININE-BSD FRML MDRD: 102 ML/MIN/1.73SQ M
GLUCOSE P FAST SERPL-MCNC: 96 MG/DL (ref 65–99)
HBA1C MFR BLD: 6 %
HCT VFR BLD AUTO: 43.3 % (ref 34.8–46.1)
HDLC SERPL-MCNC: 62 MG/DL
HGB BLD-MCNC: 14 G/DL (ref 11.5–15.4)
IMM GRANULOCYTES # BLD AUTO: 0.02 THOUSAND/UL (ref 0–0.2)
IMM GRANULOCYTES NFR BLD AUTO: 0 % (ref 0–2)
LDLC SERPL CALC-MCNC: 121 MG/DL (ref 0–100)
LYMPHOCYTES # BLD AUTO: 2.11 THOUSANDS/ÂΜL (ref 0.6–4.47)
LYMPHOCYTES NFR BLD AUTO: 40 % (ref 14–44)
MCH RBC QN AUTO: 30 PG (ref 26.8–34.3)
MCHC RBC AUTO-ENTMCNC: 32.3 G/DL (ref 31.4–37.4)
MCV RBC AUTO: 93 FL (ref 82–98)
MONOCYTES # BLD AUTO: 0.51 THOUSAND/ÂΜL (ref 0.17–1.22)
MONOCYTES NFR BLD AUTO: 10 % (ref 4–12)
NEUTROPHILS # BLD AUTO: 2.48 THOUSANDS/ÂΜL (ref 1.85–7.62)
NEUTS SEG NFR BLD AUTO: 46 % (ref 43–75)
NRBC BLD AUTO-RTO: 0 /100 WBCS
PLATELET # BLD AUTO: 340 THOUSANDS/UL (ref 149–390)
PMV BLD AUTO: 9.7 FL (ref 8.9–12.7)
POTASSIUM SERPL-SCNC: 5.3 MMOL/L (ref 3.5–5.3)
PROT SERPL-MCNC: 7.3 G/DL (ref 6.4–8.4)
RBC # BLD AUTO: 4.66 MILLION/UL (ref 3.81–5.12)
SODIUM SERPL-SCNC: 149 MMOL/L (ref 135–147)
TRIGL SERPL-MCNC: 62 MG/DL
TSH SERPL DL<=0.05 MIU/L-ACNC: 1.03 UIU/ML (ref 0.45–4.5)
WBC # BLD AUTO: 5.29 THOUSAND/UL (ref 4.31–10.16)

## 2024-03-01 PROCEDURE — 85025 COMPLETE CBC W/AUTO DIFF WBC: CPT

## 2024-03-01 PROCEDURE — 36415 COLL VENOUS BLD VENIPUNCTURE: CPT

## 2024-03-01 PROCEDURE — 99396 PREV VISIT EST AGE 40-64: CPT | Performed by: PHYSICIAN ASSISTANT

## 2024-03-01 PROCEDURE — 80053 COMPREHEN METABOLIC PANEL: CPT

## 2024-03-01 PROCEDURE — 82306 VITAMIN D 25 HYDROXY: CPT

## 2024-03-01 PROCEDURE — 80061 LIPID PANEL: CPT

## 2024-03-01 PROCEDURE — 84443 ASSAY THYROID STIM HORMONE: CPT

## 2024-03-01 PROCEDURE — 83036 HEMOGLOBIN GLYCOSYLATED A1C: CPT

## 2024-03-01 RX ORDER — THIAMINE HCL 100 MG
1 TABLET ORAL DAILY
COMMUNITY

## 2024-03-01 NOTE — PROGRESS NOTES
ADULT ANNUAL PHYSICAL  [facility]    NAME: Adelita Bingham  AGE: 54 y.o. SEX: female  : 1970     DATE: 3/2/2024     Assessment and Plan:     Problem List Items Addressed This Visit          Endocrine    IFG (impaired fasting glucose)     Limit carb intake. Recheck with labs as ordered.          Relevant Orders    Comprehensive metabolic panel (Completed)    Hemoglobin A1C (Completed)       Other    Overweight     BMI Counseling: Body mass index is 25.94 kg/m². The BMI is above normal. Nutrition recommendations include 3-5 servings of fruits/vegetables daily. Exercise recommendations include exercising 3-5 times per week.           Relevant Orders    CBC and differential (Completed)    Comprehensive metabolic panel (Completed)    Lipid Panel with Direct LDL reflex (Completed)    Hemoglobin A1C (Completed)    TSH, 3rd generation with Free T4 reflex (Completed)    Vitamin D 25 hydroxy (Completed)     Other Visit Diagnoses       Annual physical exam    -  Primary    Lipid screening        Relevant Orders    Lipid Panel with Direct LDL reflex (Completed)    Takes dietary supplements        Relevant Orders    Vitamin D 25 hydroxy (Completed)              Immunizations and preventive care screenings were discussed with patient today. Appropriate education was printed on patient's after visit summary.    Counseling:  Exercise: the importance of regular exercise/physical activity was discussed. Recommend exercise 3-5 times per week for at least 30 minutes.          Return in about 1 year (around 3/1/2025) for Annual physical.     Chief Complaint:     Chief Complaint   Patient presents with    Physical Exam      History of Present Illness:     Adult Annual Physical   Patient here for a comprehensive physical exam. The patient reports problems - as below .    IFG - last A1c was 5.3% in 2023    Dyslipidemia - minimally elevated LDL of 109 in  3/2023          Diet and Physical Activity  Diet/Nutrition: well balanced diet and consuming 3-5 servings of fruits/vegetables daily.   Exercise:  walking and some weights about 5 times weekly .      Depression Screening  PHQ-2/9 Depression Screening    Little interest or pleasure in doing things: 0 - not at all  Feeling down, depressed, or hopeless: 0 - not at all  PHQ-2 Score: 0  PHQ-2 Interpretation: Negative depression screen       General Health  Sleep:  falls asleep okay but wakes up a few times a night - better with not eating after 7 .   Hearing: normal - bilateral.  Vision: goes for regular eye exams and wears glasses.   Dental: regular dental visits.       /GYN Health  Follows with gynecology? yes   Patient is: postmenopausal  Last menstrual period: around 50 y.o.  Contraceptive method: menopause.    Advanced Care Planning  Do you have an advanced directive? yes  Do you have a durable medical power of ? yes  ACP document given to the patient? no     Review of Systems:     Review of Systems   Constitutional:  Negative for chills and fever.   HENT:  Negative for congestion, rhinorrhea and sore throat.    Eyes:  Negative for visual disturbance.   Respiratory:  Negative for cough, shortness of breath and wheezing.    Cardiovascular:  Negative for chest pain, palpitations and leg swelling.   Gastrointestinal:  Negative for abdominal pain, blood in stool, constipation, diarrhea, nausea and vomiting.   Endocrine: Negative for polydipsia and polyuria.   Genitourinary:  Negative for dysuria and frequency.   Musculoskeletal:  Negative for arthralgias and myalgias.   Skin:  Negative for rash.   Neurological:  Negative for dizziness, syncope and headaches.   Hematological:  Does not bruise/bleed easily.   Psychiatric/Behavioral:  Negative for dysphoric mood. The patient is not nervous/anxious.       Past Medical History:     History reviewed. No pertinent past medical history.   Past Surgical History:      Past Surgical History:   Procedure Laterality Date    CERVICAL CONE BIOPSY        Social History:     Social History     Socioeconomic History    Marital status: /Civil Union     Spouse name: None    Number of children: None    Years of education: None    Highest education level: None   Occupational History    None   Tobacco Use    Smoking status: Never    Smokeless tobacco: Never   Vaping Use    Vaping status: Never Used   Substance and Sexual Activity    Alcohol use: Yes     Alcohol/week: 7.0 standard drinks of alcohol     Types: 7 Glasses of wine per week     Comment: 1-2 glasses of wine a few times a week    Drug use: Never    Sexual activity: Yes     Partners: Male     Birth control/protection: None, Post-menopausal   Other Topics Concern    None   Social History Narrative    None     Social Determinants of Health     Financial Resource Strain: Not on file   Food Insecurity: Not on file   Transportation Needs: Not on file   Physical Activity: Not on file   Stress: Not on file   Social Connections: Not on file   Intimate Partner Violence: Not on file   Housing Stability: Not on file      Family History:     Family History   Problem Relation Age of Onset    Squamous cell carcinoma Mother     Other Mother         spinal stenosis    Diabetes Father     Coronary artery disease Father         4 stents    Lymphoma Father         non-Hodgkin's    Cervical cancer Sister 35    Other Brother         pituitary tumor    No Known Problems Daughter     No Known Problems Maternal Grandmother     No Known Problems Maternal Grandfather     No Known Problems Paternal Grandmother     No Known Problems Paternal Grandfather     No Known Problems Paternal Aunt     No Known Problems Paternal Aunt       Current Medications:     Current Outpatient Medications   Medication Sig Dispense Refill    Biotin 5000 MCG TABS       Calcium 500 MG tablet Take 1 tablet by mouth 2 (two) times a day      DAILY MULTIPLE VITAMINS/IRON TABS  "Take 1 tablet by mouth daily      Magnesium 500 MG TABS Take 1 tablet by mouth in the morning      vitamin B-12 (VITAMIN B-12) 1,000 mcg tablet       VITAMIN D PO Take 5,000 Units by mouth daily      zinc gluconate 50 mg tablet Take 50 mg by mouth daily       No current facility-administered medications for this visit.      Allergies:     No Known Allergies   Physical Exam:     /62 (BP Location: Left arm, Patient Position: Sitting, Cuff Size: Adult)   Pulse 72   Temp (!) 96.8 °F (36 °C) (Temporal)   Ht 5' 2\" (1.575 m)   Wt 64.3 kg (141 lb 12.8 oz)   LMP 01/11/2021   SpO2 100%   BMI 25.94 kg/m²     Physical Exam  Vitals reviewed.   Constitutional:       General: She is not in acute distress.     Appearance: Normal appearance. She is well-developed. She is not ill-appearing.   HENT:      Head: Normocephalic and atraumatic.      Right Ear: Tympanic membrane, ear canal and external ear normal.      Left Ear: Tympanic membrane, ear canal and external ear normal.      Mouth/Throat:      Mouth: Mucous membranes are moist.      Pharynx: No oropharyngeal exudate or posterior oropharyngeal erythema.   Eyes:      Conjunctiva/sclera: Conjunctivae normal.      Pupils: Pupils are equal, round, and reactive to light.   Neck:      Thyroid: No thyromegaly.      Vascular: No carotid bruit.   Cardiovascular:      Rate and Rhythm: Normal rate and regular rhythm.      Pulses: Normal pulses.      Heart sounds: Normal heart sounds. No murmur heard.  Pulmonary:      Effort: Pulmonary effort is normal.      Breath sounds: Normal breath sounds. No wheezing, rhonchi or rales.   Abdominal:      General: Bowel sounds are normal. There is no distension.      Palpations: Abdomen is soft. There is no mass.      Tenderness: There is no abdominal tenderness.   Musculoskeletal:      Cervical back: Normal range of motion and neck supple.      Right lower leg: No edema.      Left lower leg: No edema.   Lymphadenopathy:      Cervical: No " cervical adenopathy.   Skin:     General: Skin is warm and dry.      Findings: No rash.   Neurological:      Mental Status: She is alert.      Sensory: No sensory deficit.      Comments: 5/5 strength in UE and LE   Psychiatric:         Mood and Affect: Mood normal.         Behavior: Behavior normal.         Thought Content: Thought content normal.         Judgment: Judgment normal.          Ivonne Flowers PA-C  [facility]

## 2024-03-02 DIAGNOSIS — E87.0 HYPERNATREMIA: Primary | ICD-10-CM

## 2024-03-02 DIAGNOSIS — R73.01 IFG (IMPAIRED FASTING GLUCOSE): ICD-10-CM

## 2024-03-02 DIAGNOSIS — E78.5 DYSLIPIDEMIA: ICD-10-CM

## 2024-03-02 NOTE — ASSESSMENT & PLAN NOTE
BMI Counseling: Body mass index is 25.94 kg/m². The BMI is above normal. Nutrition recommendations include 3-5 servings of fruits/vegetables daily. Exercise recommendations include exercising 3-5 times per week.

## 2024-03-22 ENCOUNTER — APPOINTMENT (OUTPATIENT)
Dept: LAB | Facility: MEDICAL CENTER | Age: 54
End: 2024-03-22
Payer: COMMERCIAL

## 2024-03-22 ENCOUNTER — ANNUAL EXAM (OUTPATIENT)
Dept: GYNECOLOGY | Facility: CLINIC | Age: 54
End: 2024-03-22
Payer: COMMERCIAL

## 2024-03-22 VITALS
WEIGHT: 139 LBS | DIASTOLIC BLOOD PRESSURE: 63 MMHG | SYSTOLIC BLOOD PRESSURE: 110 MMHG | HEIGHT: 62 IN | BODY MASS INDEX: 25.58 KG/M2

## 2024-03-22 DIAGNOSIS — Z01.419 ENCOUNTER FOR GYNECOLOGICAL EXAMINATION WITHOUT ABNORMAL FINDING: Primary | ICD-10-CM

## 2024-03-22 DIAGNOSIS — N95.2 ATROPHIC VAGINITIS: ICD-10-CM

## 2024-03-22 DIAGNOSIS — E87.0 HYPERNATREMIA: ICD-10-CM

## 2024-03-22 DIAGNOSIS — Z13.820 SCREENING FOR OSTEOPOROSIS: ICD-10-CM

## 2024-03-22 DIAGNOSIS — Z78.0 MENOPAUSE: ICD-10-CM

## 2024-03-22 LAB
ANION GAP SERPL CALCULATED.3IONS-SCNC: 7 MMOL/L (ref 4–13)
BUN SERPL-MCNC: 13 MG/DL (ref 5–25)
CALCIUM SERPL-MCNC: 9.3 MG/DL (ref 8.4–10.2)
CHLORIDE SERPL-SCNC: 103 MMOL/L (ref 96–108)
CO2 SERPL-SCNC: 30 MMOL/L (ref 21–32)
CREAT SERPL-MCNC: 0.55 MG/DL (ref 0.6–1.3)
GFR SERPL CREATININE-BSD FRML MDRD: 106 ML/MIN/1.73SQ M
GLUCOSE P FAST SERPL-MCNC: 96 MG/DL (ref 65–99)
POTASSIUM SERPL-SCNC: 4 MMOL/L (ref 3.5–5.3)
SODIUM SERPL-SCNC: 140 MMOL/L (ref 135–147)

## 2024-03-22 PROCEDURE — 80048 BASIC METABOLIC PNL TOTAL CA: CPT

## 2024-03-22 PROCEDURE — G0145 SCR C/V CYTO,THINLAYER,RESCR: HCPCS | Performed by: OBSTETRICS & GYNECOLOGY

## 2024-03-22 PROCEDURE — 76977 US BONE DENSITY MEASURE: CPT | Performed by: OBSTETRICS & GYNECOLOGY

## 2024-03-22 PROCEDURE — S0612 ANNUAL GYNECOLOGICAL EXAMINA: HCPCS | Performed by: OBSTETRICS & GYNECOLOGY

## 2024-03-22 PROCEDURE — 36415 COLL VENOUS BLD VENIPUNCTURE: CPT

## 2024-03-22 RX ORDER — ESTRADIOL 10 UG/1
INSERT VAGINAL
Qty: 24 TABLET | Refills: 3 | Status: SHIPPED | OUTPATIENT
Start: 2024-03-22

## 2024-03-22 RX ORDER — MULTIVIT WITH MINERALS/LUTEIN
TABLET ORAL
COMMUNITY

## 2024-03-22 NOTE — PROGRESS NOTES
Assessment/Plan:         Diagnoses and all orders for this visit:    Encounter for gynecological examination without abnormal finding  -     Liquid-based pap, screening    Screening for osteoporosis: peripheral scan: -1.1 ; DEXA ordered  -     DXA bone density spine hip and pelvis; Future    Menopause  -     DXA bone density spine hip and pelvis; Future    Atrophic vaginitis  -     estradiol (VAGIFEM, YUVAFEM) 10 MCG TABS vaginal tablet; 1 tablet vaginally nightly for 2 weeks ,then twice weekly thereafter    Other orders  -     Ascorbic Acid (vitamin C) 1000 MG tablet      Subjective:      Patient ID: Adelita Bingham is a 54 y.o. female.    HPI patient presents for annual examination.  Denies any vaginal discharge or bleeding.  She is experiencing vaginal dryness unresponsive to over-the-counter moisturizers.  Denies any dysuria, hematuria urgency or urinary incontinence.  No GI complaints.    Colonoscopy February 2021 polyps identified.  Repeat in 5 years    Osteoporosis screening via peripheral scan March 2022.  -0.8    The following portions of the patient's history were reviewed and updated as appropriate: She  has a past medical history of Abnormal Pap smear of cervix (?) and Miscarriage (2002).  She   Patient Active Problem List    Diagnosis Date Noted    Overweight 03/01/2024    IFG (impaired fasting glucose) 02/19/2023    Right wrist pain 02/19/2023    Lateral epicondylitis of left elbow 06/26/2020    Cervical intraepithelial neoplasia grade 1 01/07/2014     She  has a past surgical history that includes Cervical cone biopsy and pr conization cervix w/wo d&c rpr knife/laser (?).  Her family history includes Cancer in her father; Cervical cancer (age of onset: 35) in her sister; Coronary artery disease in her father; Diabetes in her father; Heart disease in her father; Lymphoma in her father; No Known Problems in her daughter, maternal grandfather, maternal grandmother, paternal aunt, paternal aunt, paternal  "grandfather, and paternal grandmother; Other in her brother and mother; Squamous cell carcinoma in her mother.  She  reports that she has never smoked. She has never used smokeless tobacco. She reports current alcohol use of about 7.0 standard drinks of alcohol per week. She reports that she does not use drugs.  Current Outpatient Medications   Medication Sig Dispense Refill    Biotin 5000 MCG TABS       Calcium 500 MG tablet Take 1 tablet by mouth 2 (two) times a day      DAILY MULTIPLE VITAMINS/IRON TABS Take 1 tablet by mouth daily      estradiol (VAGIFEM, YUVAFEM) 10 MCG TABS vaginal tablet 1 tablet vaginally nightly for 2 weeks ,then twice weekly thereafter 24 tablet 3    Magnesium 500 MG TABS Take 1 tablet by mouth in the morning      vitamin B-12 (VITAMIN B-12) 1,000 mcg tablet       VITAMIN D PO Take 5,000 Units by mouth daily      zinc gluconate 50 mg tablet Take 50 mg by mouth daily      Ascorbic Acid (vitamin C) 1000 MG tablet        No current facility-administered medications for this visit.     Current Outpatient Medications on File Prior to Visit   Medication Sig    Biotin 5000 MCG TABS     Calcium 500 MG tablet Take 1 tablet by mouth 2 (two) times a day    DAILY MULTIPLE VITAMINS/IRON TABS Take 1 tablet by mouth daily    Magnesium 500 MG TABS Take 1 tablet by mouth in the morning    vitamin B-12 (VITAMIN B-12) 1,000 mcg tablet     VITAMIN D PO Take 5,000 Units by mouth daily    zinc gluconate 50 mg tablet Take 50 mg by mouth daily    Ascorbic Acid (vitamin C) 1000 MG tablet      No current facility-administered medications on file prior to visit.     She has No Known Allergies..    Review of Systems   Constitutional: Negative.    HENT:  Negative for sore throat and trouble swallowing.    Gastrointestinal: Negative.    Genitourinary: Negative.          Objective:      /63   Ht 5' 2\" (1.575 m)   Wt 63 kg (139 lb)   LMP 01/11/2021   BMI 25.42 kg/m²          Physical Exam  Vitals reviewed. "   Constitutional:       Appearance: Normal appearance. She is normal weight.   Cardiovascular:      Rate and Rhythm: Normal rate and regular rhythm.      Pulses: Normal pulses.      Heart sounds: Normal heart sounds. No murmur heard.  Pulmonary:      Effort: Pulmonary effort is normal. No respiratory distress.      Breath sounds: Normal breath sounds.   Chest:   Breasts:     Right: No swelling, bleeding, inverted nipple, mass, nipple discharge, skin change or tenderness.      Left: No swelling, bleeding, inverted nipple, mass, nipple discharge, skin change or tenderness.   Abdominal:      General: There is no distension.      Palpations: Abdomen is soft. There is no mass.      Tenderness: There is no abdominal tenderness. There is no guarding or rebound.      Hernia: No hernia is present. There is no hernia in the left inguinal area or right inguinal area.   Genitourinary:     General: Normal vulva.      Labia:         Right: No rash, tenderness or lesion.         Left: No rash, tenderness or lesion.       Vagina: Normal.      Cervix: Normal.      Uterus: Normal.       Adnexa:         Right: No mass, tenderness or fullness.          Left: No mass, tenderness or fullness.     Musculoskeletal:      Cervical back: Normal range of motion and neck supple. No tenderness.   Lymphadenopathy:      Cervical: No cervical adenopathy.      Upper Body:      Right upper body: No supraclavicular, axillary or pectoral adenopathy.      Left upper body: No supraclavicular, axillary or pectoral adenopathy.      Lower Body: No right inguinal adenopathy. No left inguinal adenopathy.   Neurological:      Mental Status: She is alert.

## 2024-03-29 ENCOUNTER — TELEPHONE (OUTPATIENT)
Age: 54
End: 2024-03-29

## 2024-03-29 LAB
LAB AP GYN PRIMARY INTERPRETATION: NORMAL
Lab: NORMAL

## 2024-03-29 NOTE — TELEPHONE ENCOUNTER
Patient called regarding pap smear results. Advised patient not resulted yet and once they result and provider reviews someone from our clinical team will call and review. Patient verbalized understanding. No further questions at this time.

## 2024-04-12 ENCOUNTER — TELEPHONE (OUTPATIENT)
Age: 54
End: 2024-04-12

## 2024-04-12 DIAGNOSIS — N95.2 ATROPHIC VAGINITIS: ICD-10-CM

## 2024-04-12 RX ORDER — ESTRADIOL 10 UG/1
INSERT VAGINAL
Qty: 24 TABLET | Refills: 3 | Status: SHIPPED | OUTPATIENT
Start: 2024-04-12 | End: 2024-04-12 | Stop reason: SDUPTHER

## 2024-04-12 RX ORDER — ESTRADIOL 10 UG/1
INSERT VAGINAL
Qty: 24 TABLET | Refills: 3 | Status: SHIPPED | OUTPATIENT
Start: 2024-04-12

## 2024-04-12 NOTE — TELEPHONE ENCOUNTER
"Patient wants to transfer estradiol prescription to [facility]. She has already completed 1 tablet nightly for 2 weeks. Please submit a new 90-day prescription with directions that specify \"insert 1 tablet twice weekly\"    Reason for call:   [x] Refill   [] Prior Auth  [] Other:     Office:   [] PCP/Provider -   [x] Specialty/Provider - Gyn / [place]    Medication: estradiol    Dose/Frequency: 10mcg twice weekly    Quantity: 24    Pharmacy: [facility] - [address]     Does the patient have enough for 3 days?   [x] Yes   [] No - Send as HP to POD    "

## 2024-04-15 ENCOUNTER — TELEPHONE (OUTPATIENT)
Dept: GYNECOLOGY | Facility: CLINIC | Age: 54
End: 2024-04-15

## 2024-04-15 DIAGNOSIS — N95.2 ATROPHIC VAGINITIS: Primary | ICD-10-CM

## 2024-04-15 DIAGNOSIS — N95.2 ATROPHIC VAGINITIS: ICD-10-CM

## 2024-04-15 DIAGNOSIS — Z78.0 MENOPAUSE: ICD-10-CM

## 2024-04-15 RX ORDER — ESTRADIOL 10 UG/1
INSERT VAGINAL
Qty: 4 TABLET | Refills: 0 | OUTPATIENT
Start: 2024-04-15

## 2024-04-15 RX ORDER — ESTRADIOL 10 UG/1
1 INSERT VAGINAL DAILY
Qty: 10 TABLET | Refills: 0 | Status: SHIPPED | OUTPATIENT
Start: 2024-04-15

## 2024-04-15 RX ORDER — ESTRADIOL 10 UG/1
INSERT VAGINAL
Qty: 24 TABLET | Refills: 3 | Status: SHIPPED | OUTPATIENT
Start: 2024-04-15 | End: 2024-04-16 | Stop reason: SDUPTHER

## 2024-04-15 RX ORDER — ESTRADIOL 10 UG/1
1 INSERT VAGINAL 2 TIMES WEEKLY
Qty: 4 TABLET | Refills: 0 | Status: SHIPPED | OUTPATIENT
Start: 2024-04-15

## 2024-04-15 NOTE — TELEPHONE ENCOUNTER
Reason for call:   [x] Refill   [] Prior Auth  [x] Other: Pt requested a bridge supply until [facility]    Office:   [] PCP/Provider -   [x] Specialty/Provider - Fransisco Harris DO     Medication: estradiol     Dose/Frequency: 10 mcg / 1 tab vaginally twice a week    Quantity: 4 tabs    Pharmacy: [id number] [address] [facility]      Does the patient have enough for 3 days?   [] Yes   [x] No - Send as HP to POD

## 2024-04-15 NOTE — TELEPHONE ENCOUNTER
Please [facility] with 3 refills dr sent earlier but call back needed emergency supply local and it discontinued mail order   Reason for call:   [x] Refill   [] Prior Auth  [] Other:     Office:   [] PCP/Provider -   [x] Specialty/Provider - gyn Fransisco Harris    Medication:estradiol vaginal tab  Dose/Frequency: 10 mcg twice a week   Quantity: 24 3 refills   Pharmacy: [email or web address] [place], [place] [address]    Medication:estradiol vaginal tab  Dose/Frequency:  10 mcg twice a week  Quantity: 4   Pharmacy: [facility]      Does the patient have enough for 3 days?   [] Yes   [x] No - Send as HP to POD    [facility] refills dr sent earlier but call back needed emergency supply local and it discontinued mail order

## 2024-04-16 DIAGNOSIS — N95.2 ATROPHIC VAGINITIS: ICD-10-CM

## 2024-04-16 RX ORDER — ESTRADIOL 10 UG/1
INSERT VAGINAL
Qty: 24 TABLET | Refills: 3 | Status: SHIPPED | OUTPATIENT
Start: 2024-04-16

## 2024-05-30 ENCOUNTER — OFFICE VISIT (OUTPATIENT)
Dept: FAMILY MEDICINE CLINIC | Facility: CLINIC | Age: 54
End: 2024-05-30
Payer: COMMERCIAL

## 2024-05-30 VITALS
BODY MASS INDEX: 25.79 KG/M2 | SYSTOLIC BLOOD PRESSURE: 110 MMHG | TEMPERATURE: 97.8 F | DIASTOLIC BLOOD PRESSURE: 68 MMHG | WEIGHT: 141 LBS | HEART RATE: 80 BPM | OXYGEN SATURATION: 98 %

## 2024-05-30 DIAGNOSIS — M54.50 ACUTE RIGHT-SIDED LOW BACK PAIN WITHOUT SCIATICA: Primary | ICD-10-CM

## 2024-05-30 PROCEDURE — 99213 OFFICE O/P EST LOW 20 MIN: CPT | Performed by: FAMILY MEDICINE

## 2024-05-30 RX ORDER — NAPROXEN SOD/DIPHENHYDRAMINE 220MG-25MG
220 TABLET ORAL DAILY
COMMUNITY

## 2024-05-30 RX ORDER — METHOCARBAMOL 500 MG/1
500 TABLET, FILM COATED ORAL 3 TIMES DAILY
Qty: 30 TABLET | Refills: 0 | Status: SHIPPED | OUTPATIENT
Start: 2024-05-30

## 2024-05-30 NOTE — PROGRESS NOTES
Assessment/Plan:       Problem List Items Addressed This Visit    None  Visit Diagnoses       Acute right-sided low back pain without sciatica    -  Primary    Relevant Medications    methocarbamol (ROBAXIN) 500 mg tablet            Start home exercises and robaxin refer to PT if not improving, imaging if not improved after PT.    Subjective:      Patient ID: Adelita Bingham is a 54 y.o. female.    HPI    54 year old presenting for two weeks of low back pain,    Occurred after doing gardening and lifitng object    Pain does not radiate, muscle feel very tight.        The following portions of the patient's history were reviewed and updated as appropriate: allergies, current medications, past family history, past medical history, past social history, past surgical history and problem list.      Current Outpatient Medications:     Ascorbic Acid (vitamin C) 1000 MG tablet, , Disp: , Rfl:     Biotin 5000 MCG TABS, , Disp: , Rfl:     Calcium 500 MG tablet, Take 1 tablet by mouth 2 (two) times a day, Disp: , Rfl:     DAILY MULTIPLE VITAMINS/IRON TABS, Take 1 tablet by mouth daily, Disp: , Rfl:     estradiol (VAGIFEM, YUVAFEM) 10 MCG TABS vaginal tablet, Insert 1 tablet (10 mcg total) into the vagina daily, Disp: 10 tablet, Rfl: 0    estradiol (VAGIFEM, YUVAFEM) 10 MCG TABS vaginal tablet, Insert 1 tablet (10 mcg total) into the vagina 2 (two) times a week, Disp: 4 tablet, Rfl: 0    estradiol (VAGIFEM, YUVAFEM) 10 MCG TABS vaginal tablet, 1 tablet vaginally twice weekly, Disp: 24 tablet, Rfl: 3    Magnesium 500 MG TABS, Take 1 tablet by mouth in the morning, Disp: , Rfl:     methocarbamol (ROBAXIN) 500 mg tablet, Take 1 tablet (500 mg total) by mouth 3 (three) times a day, Disp: 30 tablet, Rfl: 0    Naproxen Sod-diphenhydrAMINE (Aleve PM) 220-25 MG TABS, Take 220 mg by mouth daily, Disp: , Rfl:     vitamin B-12 (VITAMIN B-12) 1,000 mcg tablet, , Disp: , Rfl:     VITAMIN D PO, Take 5,000 Units by mouth daily, Disp: , Rfl:      zinc gluconate 50 mg tablet, Take 50 mg by mouth daily, Disp: , Rfl:      Review of Systems   Constitutional:  Negative for activity change and appetite change.   Respiratory:  Negative for apnea and chest tightness.    Cardiovascular:  Negative for chest pain and palpitations.   Gastrointestinal:  Negative for abdominal distention and abdominal pain.   Musculoskeletal:  Positive for back pain. Negative for arthralgias.         Objective:      /68 (BP Location: Left arm, Cuff Size: Standard)   Pulse 80   Temp 97.8 °F (36.6 °C) (Tympanic)   Wt 64 kg (141 lb)   LMP 01/11/2021   SpO2 98%   BMI 25.79 kg/m²          Physical Exam  Constitutional:       Appearance: Normal appearance.   Cardiovascular:      Rate and Rhythm: Normal rate and regular rhythm.      Pulses: Normal pulses.      Heart sounds: Normal heart sounds.   Pulmonary:      Effort: Pulmonary effort is normal.      Breath sounds: Normal breath sounds.   Musculoskeletal:      Cervical back: Normal.      Thoracic back: Normal.      Lumbar back: Decreased range of motion. Negative right straight leg raise test and negative left straight leg raise test.   Neurological:      Mental Status: She is alert.           Aren Veronica MD

## 2024-06-07 ENCOUNTER — HOSPITAL ENCOUNTER (OUTPATIENT)
Dept: MAMMOGRAPHY | Facility: MEDICAL CENTER | Age: 54
Discharge: HOME | End: 2024-06-07
Payer: COMMERCIAL

## 2024-06-07 ENCOUNTER — HOSPITAL ENCOUNTER (OUTPATIENT)
Dept: BONE DENSITY | Facility: MEDICAL CENTER | Age: 54
Discharge: HOME | End: 2024-06-07
Payer: COMMERCIAL

## 2024-06-07 VITALS — HEIGHT: 62 IN | BODY MASS INDEX: 25.95 KG/M2 | WEIGHT: 141 LBS

## 2024-06-07 DIAGNOSIS — Z78.0 MENOPAUSE: ICD-10-CM

## 2024-06-07 DIAGNOSIS — Z13.820 SCREENING FOR OSTEOPOROSIS: ICD-10-CM

## 2024-06-07 DIAGNOSIS — Z12.31 VISIT FOR SCREENING MAMMOGRAM: ICD-10-CM

## 2024-06-07 PROCEDURE — 77080 DXA BONE DENSITY AXIAL: CPT

## 2024-06-07 PROCEDURE — 77063 BREAST TOMOSYNTHESIS BI: CPT

## 2024-06-07 PROCEDURE — 77067 SCR MAMMO BI INCL CAD: CPT

## 2024-08-13 DIAGNOSIS — N95.2 ATROPHIC VAGINITIS: ICD-10-CM

## 2024-08-13 NOTE — TELEPHONE ENCOUNTER
Received fax from [facility] stating patient needs refill of estradiol tablets.  Called patient stating she should have refills available on this and no new script should be needed at this time.  Script was given to her in April (3 month supply with 3 refills).  Told her to double check medication boxes at home and if she truly needs refill, please call office

## 2024-08-14 RX ORDER — ESTRADIOL 10 UG/1
1 INSERT VAGINAL DAILY
Qty: 10 TABLET | Refills: 0 | Status: SHIPPED | OUTPATIENT
Start: 2024-08-14

## 2024-08-14 NOTE — TELEPHONE ENCOUNTER
Reason for call:   [x] Refill   [] Prior Auth  [x] Other: pt checked with pharm no refills left    Office:   [] PCP/Provider -   [x] Specialty/Provider -         Does the patient have enough for 3 days?   [] Yes   [x] No - Send as HP to POD

## 2024-09-06 ENCOUNTER — APPOINTMENT (OUTPATIENT)
Dept: LAB | Facility: MEDICAL CENTER | Age: 54
End: 2024-09-06
Payer: COMMERCIAL

## 2024-09-06 DIAGNOSIS — E78.5 DYSLIPIDEMIA: ICD-10-CM

## 2024-09-06 DIAGNOSIS — R73.01 IFG (IMPAIRED FASTING GLUCOSE): ICD-10-CM

## 2024-09-06 LAB
CHOLEST SERPL-MCNC: 172 MG/DL
HDLC SERPL-MCNC: 53 MG/DL
LDLC SERPL CALC-MCNC: 108 MG/DL (ref 0–100)
TRIGL SERPL-MCNC: 54 MG/DL

## 2024-09-06 PROCEDURE — 83036 HEMOGLOBIN GLYCOSYLATED A1C: CPT

## 2024-09-06 PROCEDURE — 80061 LIPID PANEL: CPT

## 2024-09-06 PROCEDURE — 36415 COLL VENOUS BLD VENIPUNCTURE: CPT

## 2024-09-07 DIAGNOSIS — Z78.9 TAKES DIETARY SUPPLEMENTS: ICD-10-CM

## 2024-09-07 DIAGNOSIS — R73.01 IFG (IMPAIRED FASTING GLUCOSE): ICD-10-CM

## 2024-09-07 DIAGNOSIS — E78.5 DYSLIPIDEMIA: ICD-10-CM

## 2024-09-07 DIAGNOSIS — E66.3 OVERWEIGHT: Primary | ICD-10-CM

## 2024-09-07 LAB
EST. AVERAGE GLUCOSE BLD GHB EST-MCNC: 123 MG/DL
HBA1C MFR BLD: 5.9 %

## 2024-10-21 ENCOUNTER — TELEPHONE (OUTPATIENT)
Age: 54
End: 2024-10-21

## 2024-10-21 NOTE — TELEPHONE ENCOUNTER
Patient called office stating she is currently on estradiol inserts for 6 months and does not feel that is working for her how she would like it to. She did not describe in detail, when asked. She would like to know if she can switch to the cream form. She does have a couple boxes left. Will route to provider to request order for estradiol cream per patient request.

## 2024-10-22 DIAGNOSIS — N95.2 ATROPHIC VAGINITIS: ICD-10-CM

## 2024-10-22 RX ORDER — ESTRADIOL 0.1 MG/G
1 CREAM VAGINAL WEEKLY
Qty: 42.5 G | Refills: 3 | Status: SHIPPED | OUTPATIENT
Start: 2024-10-22

## 2024-10-22 RX ORDER — ESTRADIOL 10 UG/1
1 INSERT VAGINAL 2 TIMES WEEKLY
Qty: 24 TABLET | Refills: 3 | Status: SHIPPED | OUTPATIENT
Start: 2024-10-24

## 2024-11-01 ENCOUNTER — TELEPHONE (OUTPATIENT)
Age: 54
End: 2024-11-01

## 2024-11-01 NOTE — TELEPHONE ENCOUNTER
Patient called the RX Refill Line. Message is being forwarded to the office.     Patient called stating that [facility] is saying there is problem filling the estradiol (ESTRACE) 0.1 mg/g vaginal cream for the patient. She would like the office to give [facility] a call to see what information they need to fill this script.  [facility]: [phone]    Please contact patient at [phone] with an update

## 2024-11-04 NOTE — TELEPHONE ENCOUNTER
Patient called back to provide a phone number for [facility] [phone].   Please contact patient at [phone] for additional questions.

## 2024-11-04 NOTE — TELEPHONE ENCOUNTER
LV for pt stating that I am unable to get through to the pharmacy. I let her know she can call back with an alternate phone number if she has one.

## 2024-11-05 NOTE — TELEPHONE ENCOUNTER
Patient called to confirm the name of the medication. She is calling the insurance to see if it is not covered at all or if it needs a prior authorization. Patient will call back if PA is required.

## 2024-11-05 NOTE — TELEPHONE ENCOUNTER
Spoke with Maile a  with [facility] and she stated that the estradiol was on hold and cannot be filled because it is not covered. I LV for pt letting her know med is not covered and to call us back if she would like to try an alternat pharmacy

## 2024-11-06 NOTE — TELEPHONE ENCOUNTER
Patient called advising she contacted the insurance and they advised there is a Generic Estradiol cream that they cover, the insurance didn't provide the patient with a name of it.  Patient is asking for that to be [facility] to [facility], as patient wants a cream now instead of the vaginal tablets.

## 2024-11-06 NOTE — TELEPHONE ENCOUNTER
Reached back out to pharmacy and script is covered. They are waiting on verbal permission from the patient to ship out the medication. Notified pt and she will call [facility] to confirm shipment for med

## 2025-02-01 ENCOUNTER — RESULTS FOLLOW-UP (OUTPATIENT)
Dept: FAMILY MEDICINE CLINIC | Facility: CLINIC | Age: 55
End: 2025-02-01

## 2025-02-15 ENCOUNTER — OFFICE VISIT (OUTPATIENT)
Dept: URGENT CARE | Facility: CLINIC | Age: 55
End: 2025-02-15
Payer: COMMERCIAL

## 2025-02-15 VITALS
DIASTOLIC BLOOD PRESSURE: 72 MMHG | RESPIRATION RATE: 18 BRPM | OXYGEN SATURATION: 99 % | HEART RATE: 76 BPM | SYSTOLIC BLOOD PRESSURE: 116 MMHG

## 2025-02-15 DIAGNOSIS — Z23 ENCOUNTER FOR IMMUNIZATION: Primary | ICD-10-CM

## 2025-02-15 DIAGNOSIS — S61.210A LACERATION OF RIGHT INDEX FINGER WITHOUT FOREIGN BODY WITHOUT DAMAGE TO NAIL, INITIAL ENCOUNTER: ICD-10-CM

## 2025-02-15 PROCEDURE — 90714 TD VACC NO PRESV 7 YRS+ IM: CPT | Performed by: PHYSICIAN ASSISTANT

## 2025-02-15 PROCEDURE — 99211 OFF/OP EST MAY X REQ PHY/QHP: CPT | Performed by: PHYSICIAN ASSISTANT

## 2025-02-15 PROCEDURE — 12001 RPR S/N/AX/GEN/TRNK 2.5CM/<: CPT | Performed by: PHYSICIAN ASSISTANT

## 2025-02-15 PROCEDURE — 90471 IMMUNIZATION ADMIN: CPT | Performed by: PHYSICIAN ASSISTANT

## 2025-02-15 PROCEDURE — 90715 TDAP VACCINE 7 YRS/> IM: CPT

## 2025-02-15 NOTE — PROGRESS NOTES
[facility]    NAME: Adelita Bingham is a 55 y.o. female  : 1970    MRN: [id number]  DATE: February 15, 2025  TIME: 2:23 PM    Assessment and Plan   Encounter for immunization [Z23]  1. Encounter for immunization  Tdap Vaccine greater than or equal to 6yo      2. Laceration of right index finger without foreign body without damage to nail, initial encounter  Laceration repair    CANCELED: Splint        Nurse administered Tdap vaccine.      Patient Instructions     Patient Instructions   Surgifoam used to slow / stop bleeding.    After cleansing area, staff applied Surgifoam dressing to your wound to help staunch bleeding.    Often time this product will adhere to wound bed to control bleeding while healthy cells grow from the bottom of the wound bed up and the outside edges in to heal wound.    Product may stay on from a few days to several days.  Recommend not ripping product off of finger.    If Surgifoam comes off prematurely and bleeding restarts, apply  pressure dressing over area and do not remove for 24 hours.      Today elevate, if able, to control bleeding.    Keep area clean and covered.  Clean surrounding area daily with plain soap and water.    If any looseness of Surgifoam, may trim back as needed.  Allowed to come off and on.    Chief Complaint     Chief Complaint   Patient presents with    Laceration     Pt sustained a lac to her right index finger today around 45 mins ago when she cut it against a cheese grater that was stuck in the drawer        History of Present Illness   Adelita Bingham presents to the clinic c/o  55-year-old right-hand-dominant female comes in with injury right index finger.    Was reaching into a drawer w/n last hour and caught her finger on a brand-new very sharp grater.  Took a chunk out of the skin of her finger.  Found chunk in greater.      Last Tdap: Unknown.        Review of Systems   Review of Systems   Constitutional:  Positive for activity change.    Skin:  Positive for wound.       Current Medications     Long-Term Medications   Medication Sig Dispense Refill    Biotin 5000 MCG TABS       Calcium 500 MG tablet Take 1 tablet by mouth 2 (two) times a day      DAILY MULTIPLE VITAMINS/IRON TABS Take 1 tablet by mouth daily      estradiol (ESTRACE) 0.1 mg/g vaginal cream Insert 1 g into the vagina once a week 42.5 g 3    estradiol (VAGIFEM, YUVAFEM) 10 MCG TABS vaginal tablet 1 tablet vaginally twice weekly 24 tablet 3    estradiol (VAGIFEM, YUVAFEM) 10 MCG TABS vaginal tablet Insert 1 tablet (10 mcg total) into the vagina 2 (two) times a week 24 tablet 3    Magnesium 500 MG TABS Take 1 tablet by mouth in the morning      methocarbamol (ROBAXIN) 500 mg tablet Take 1 tablet (500 mg total) by mouth 3 (three) times a day 30 tablet 0    Naproxen Sod-diphenhydrAMINE (Aleve PM) 220-25 MG TABS Take 220 mg by mouth daily      vitamin B-12 (VITAMIN B-12) 1,000 mcg tablet       zinc gluconate 50 mg tablet Take 50 mg by mouth daily         Current Allergies     Allergies as of 02/15/2025    (No Known Allergies)          The following portions of the patient's history were reviewed and updated as appropriate: allergies, current medications, past family history, past medical history, past social history, past surgical history and problem list.  Past Medical History:   Diagnosis Date    Abnormal Pap smear of cervix ?    Miscarriage 2002     Past Surgical History:   Procedure Laterality Date    CERVICAL CONE BIOPSY      NE CONIZATION CERVIX W/WO D&C RPR KNIFE/LASER  ?     Family History   Problem Relation Age of Onset    Squamous cell carcinoma Mother     Other Mother         spinal stenosis    Diabetes Father     Coronary artery disease Father         4 stents    Lymphoma Father         non-Hodgkin's    Cancer Father         non hodgkin lymphoma    Heart disease Father         stents    Cervical cancer Sister 35    No Known Problems Daughter     No Known Problems Maternal  Grandmother     No Known Problems Maternal Grandfather     No Known Problems Paternal Grandmother     No Known Problems Paternal Grandfather     Other Brother         pituitary tumor    No Known Problems Paternal Aunt     No Known Problems Paternal Aunt     Breast cancer Neg Hx        Objective   /72   Pulse 76   Resp 18   LMP 01/11/2021   SpO2 99%   Patient's last menstrual period was 01/11/2021.       Physical Exam     Physical Exam  Vitals and nursing note reviewed.   Constitutional:       General: She is not in acute distress.     Appearance: She is well-developed. She is not ill-appearing, toxic-appearing or diaphoretic.      Comments: Holding bandage around right index finger.   Cardiovascular:      Rate and Rhythm: Normal rate.   Pulmonary:      Effort: Pulmonary effort is normal.   Musculoskeletal:         General: Tenderness and signs of injury present. No swelling.      Comments: Good ROM RIF.   Skin:     General: Skin is warm.      Findings: Lesion present.      Comments: Right index finger palmar aspect at fold of DIP joint with 0.4 cm avulsed skin.  Active bleeding.   Neurological:      Mental Status: She is alert and oriented to person, place, and time.      Sensory: No sensory deficit.   Psychiatric:         Mood and Affect: Mood normal.         Behavior: Behavior normal.       [place] Protocol:  procedure performed by consultantConsent: Verbal consent obtained.  Consent given by: patient  Patient understanding: patient states understanding of the procedure being performed  Patient identity confirmed: verbally with patient  Laceration repair    Date/Time: 2/15/2025 1:30 PM    Performed by: Pablo Samayoa RN  Authorized by: Josi Hair PA-C  Body area: upper extremity  Location details: right index finger  Laceration length: 0.4 cm  Foreign bodies: no foreign bodies  Tendon involvement: none  Nerve involvement: none  Vascular damage: no      Procedure Details:  Preparation: Patient was  prepped and draped in the usual sterile fashion.  Irrigation solution: saline  Irrigation method: syringe  Amount of cleaning: standard  Debridement: none  Degree of undermining: none  Wound skin closure material used: surgifoam.  Approximation difficulty: simple  Dressing: pressure dressing  Patient tolerance: patient tolerated the procedure well with no immediate complications

## 2025-02-15 NOTE — PATIENT INSTRUCTIONS
Surgifoam used to slow / stop bleeding.    After cleansing area, staff applied Surgifoam dressing to your wound to help staunch bleeding.    Often time this product will adhere to wound bed to control bleeding while healthy cells grow from the bottom of the wound bed up and the outside edges in to heal wound.    Product may stay on from a few days to several days.  Recommend not ripping product off of finger.    If Surgifoam comes off prematurely and bleeding restarts, apply  pressure dressing over area and do not remove for 24 hours.      Today elevate, if able, to control bleeding.    Keep area clean and covered.  Clean surrounding area daily with plain soap and water.    If any looseness of Surgifoam, may trim back as needed.  Allowed to come off and on.

## 2025-02-21 ENCOUNTER — APPOINTMENT (OUTPATIENT)
Dept: LAB | Facility: MEDICAL CENTER | Age: 55
End: 2025-02-21
Payer: COMMERCIAL

## 2025-02-21 DIAGNOSIS — R73.01 IFG (IMPAIRED FASTING GLUCOSE): ICD-10-CM

## 2025-02-21 DIAGNOSIS — E66.3 OVERWEIGHT: ICD-10-CM

## 2025-02-21 DIAGNOSIS — Z78.9 TAKES DIETARY SUPPLEMENTS: ICD-10-CM

## 2025-02-21 DIAGNOSIS — E78.5 DYSLIPIDEMIA: ICD-10-CM

## 2025-02-21 LAB
25(OH)D3 SERPL-MCNC: 90 NG/ML (ref 30–100)
ALBUMIN SERPL BCG-MCNC: 4.4 G/DL (ref 3.5–5)
ALP SERPL-CCNC: 61 U/L (ref 34–104)
ALT SERPL W P-5'-P-CCNC: 23 U/L (ref 7–52)
ANION GAP SERPL CALCULATED.3IONS-SCNC: 8 MMOL/L (ref 4–13)
AST SERPL W P-5'-P-CCNC: 21 U/L (ref 13–39)
BASOPHILS # BLD AUTO: 0.04 THOUSANDS/ΜL (ref 0–0.1)
BASOPHILS NFR BLD AUTO: 1 % (ref 0–1)
BILIRUB SERPL-MCNC: 0.47 MG/DL (ref 0.2–1)
BUN SERPL-MCNC: 16 MG/DL (ref 5–25)
CALCIUM SERPL-MCNC: 9.7 MG/DL (ref 8.4–10.2)
CHLORIDE SERPL-SCNC: 103 MMOL/L (ref 96–108)
CHOLEST SERPL-MCNC: 167 MG/DL (ref ?–200)
CO2 SERPL-SCNC: 29 MMOL/L (ref 21–32)
CREAT SERPL-MCNC: 0.67 MG/DL (ref 0.6–1.3)
EOSINOPHIL # BLD AUTO: 0.14 THOUSAND/ΜL (ref 0–0.61)
EOSINOPHIL NFR BLD AUTO: 2 % (ref 0–6)
ERYTHROCYTE [DISTWIDTH] IN BLOOD BY AUTOMATED COUNT: 12.8 % (ref 11.6–15.1)
EST. AVERAGE GLUCOSE BLD GHB EST-MCNC: 131 MG/DL
GFR SERPL CREATININE-BSD FRML MDRD: 99 ML/MIN/1.73SQ M
GLUCOSE P FAST SERPL-MCNC: 113 MG/DL (ref 65–99)
HBA1C MFR BLD: 6.2 %
HCT VFR BLD AUTO: 43.6 % (ref 34.8–46.1)
HDLC SERPL-MCNC: 48 MG/DL
HGB BLD-MCNC: 14.4 G/DL (ref 11.5–15.4)
IMM GRANULOCYTES # BLD AUTO: 0.04 THOUSAND/UL (ref 0–0.2)
IMM GRANULOCYTES NFR BLD AUTO: 1 % (ref 0–2)
LDLC SERPL CALC-MCNC: 107 MG/DL (ref 0–100)
LYMPHOCYTES # BLD AUTO: 1.81 THOUSANDS/ΜL (ref 0.6–4.47)
LYMPHOCYTES NFR BLD AUTO: 23 % (ref 14–44)
MCH RBC QN AUTO: 30.7 PG (ref 26.8–34.3)
MCHC RBC AUTO-ENTMCNC: 33 G/DL (ref 31.4–37.4)
MCV RBC AUTO: 93 FL (ref 82–98)
MONOCYTES # BLD AUTO: 0.5 THOUSAND/ΜL (ref 0.17–1.22)
MONOCYTES NFR BLD AUTO: 6 % (ref 4–12)
NEUTROPHILS # BLD AUTO: 5.5 THOUSANDS/ΜL (ref 1.85–7.62)
NEUTS SEG NFR BLD AUTO: 67 % (ref 43–75)
NRBC BLD AUTO-RTO: 0 /100 WBCS
PLATELET # BLD AUTO: 396 THOUSANDS/UL (ref 149–390)
PMV BLD AUTO: 9.6 FL (ref 8.9–12.7)
POTASSIUM SERPL-SCNC: 5.2 MMOL/L (ref 3.5–5.3)
PROT SERPL-MCNC: 7.2 G/DL (ref 6.4–8.4)
RBC # BLD AUTO: 4.69 MILLION/UL (ref 3.81–5.12)
SODIUM SERPL-SCNC: 140 MMOL/L (ref 135–147)
TRIGL SERPL-MCNC: 61 MG/DL (ref ?–150)
TSH SERPL DL<=0.05 MIU/L-ACNC: 0.67 UIU/ML (ref 0.45–4.5)
WBC # BLD AUTO: 8.03 THOUSAND/UL (ref 4.31–10.16)

## 2025-02-21 PROCEDURE — 85025 COMPLETE CBC W/AUTO DIFF WBC: CPT

## 2025-02-21 PROCEDURE — 80061 LIPID PANEL: CPT

## 2025-02-21 PROCEDURE — 36415 COLL VENOUS BLD VENIPUNCTURE: CPT

## 2025-02-21 PROCEDURE — 80053 COMPREHEN METABOLIC PANEL: CPT

## 2025-02-21 PROCEDURE — 83036 HEMOGLOBIN GLYCOSYLATED A1C: CPT

## 2025-02-21 PROCEDURE — 84443 ASSAY THYROID STIM HORMONE: CPT

## 2025-02-21 PROCEDURE — 82306 VITAMIN D 25 HYDROXY: CPT

## 2025-02-22 ENCOUNTER — RESULTS FOLLOW-UP (OUTPATIENT)
Dept: FAMILY MEDICINE CLINIC | Facility: CLINIC | Age: 55
End: 2025-02-22

## 2025-03-07 ENCOUNTER — OFFICE VISIT (OUTPATIENT)
Dept: FAMILY MEDICINE CLINIC | Facility: CLINIC | Age: 55
End: 2025-03-07
Payer: COMMERCIAL

## 2025-03-07 VITALS
HEIGHT: 62 IN | WEIGHT: 139.4 LBS | RESPIRATION RATE: 18 BRPM | DIASTOLIC BLOOD PRESSURE: 68 MMHG | BODY MASS INDEX: 25.65 KG/M2 | HEART RATE: 80 BPM | OXYGEN SATURATION: 100 % | TEMPERATURE: 97.8 F | SYSTOLIC BLOOD PRESSURE: 118 MMHG

## 2025-03-07 DIAGNOSIS — E66.3 OVERWEIGHT: ICD-10-CM

## 2025-03-07 DIAGNOSIS — E78.5 DYSLIPIDEMIA: ICD-10-CM

## 2025-03-07 DIAGNOSIS — R73.03 PREDIABETES: ICD-10-CM

## 2025-03-07 DIAGNOSIS — Z78.9 TAKES DIETARY SUPPLEMENTS: ICD-10-CM

## 2025-03-07 DIAGNOSIS — Z00.00 ANNUAL PHYSICAL EXAM: Primary | ICD-10-CM

## 2025-03-07 PROCEDURE — 99396 PREV VISIT EST AGE 40-64: CPT | Performed by: PHYSICIAN ASSISTANT

## 2025-03-07 NOTE — ASSESSMENT & PLAN NOTE
BMI Counseling: Body mass index is 25.5 kg/m². The BMI is above normal. Nutrition recommendations include moderation in carbohydrate intake. Exercise recommendations include exercising 3-5 times per week.  She recently started intermittent fasting as well. She will work on increasing exercise.

## 2025-03-07 NOTE — PATIENT INSTRUCTIONS
"Patient Education     Routine physical for adults   The Basics   Written by the doctors and editors at [facility]   What is a physical? -- A physical is a routine visit, or \"check-up,\" with your doctor. You might also hear it called a \"wellness visit\" or \"preventive visit.\"  During each visit, the doctor will:   Ask about your physical and mental health   Ask about your habits, behaviors, and lifestyle   Do an exam   Give you vaccines if needed   Talk to you about any medicines you take   Give advice about your health   Answer your questions  Getting regular check-ups is an important part of taking care of your health. It can help your doctor find and treat any problems you have. But it's also important for preventing health problems.  A routine physical is different from a \"sick visit.\" A sick visit is when you see a doctor because of a health concern or problem. Since physicals are scheduled ahead of time, you can think about what you want to ask the doctor.  How often should I get a physical? -- It depends on your age and health. In general, for people age 21 years and older:   If you are younger than 50 years, you might be able to get a physical every 3 years.   If you are 50 years or older, your doctor might recommend a physical every year.  If you have an ongoing health condition, like diabetes or high blood pressure, your doctor will probably want to see you more often.  What happens during a physical? -- In general, each visit will include:   Physical exam - The doctor or nurse will check your height, weight, heart rate, and blood pressure. They will also look at your eyes and ears. They will ask about how you are feeling and whether you have any symptoms that bother you.   Medicines - It's a good idea to bring a list of all the medicines you take to each doctor visit. Your doctor will talk to you about your medicines and answer any questions. Tell them if you are having any side effects that bother you. You " "should also tell them if you are having trouble paying for any of your medicines.   Habits and behaviors - This includes:   Your diet   Your exercise habits   Whether you smoke, drink alcohol, or use drugs   Whether you are sexually active   Whether you feel safe at home  Your doctor will talk to you about things you can do to improve your health and lower your risk of health problems. They will also offer help and support. For example, if you want to quit smoking, they can give you advice and might prescribe medicines. If you want to improve your diet or get more physical activity, they can help you with this, too.   Lab tests, if needed - The tests you get will depend on your age and situation. For example, your doctor might want to check your:   Cholesterol   Blood sugar   Iron level   Vaccines - The recommended vaccines will depend on your age, health, and what vaccines you already had. Vaccines are very important because they can prevent certain serious or deadly infections.   Discussion of screening - \"Screening\" means checking for diseases or other health problems before they cause symptoms. Your doctor can recommend screening based on your age, risk, and preferences. This might include tests to check for:   Cancer, such as breast, prostate, cervical, ovarian, colorectal, prostate, lung, or skin cancer   Sexually transmitted infections, such as chlamydia and gonorrhea   Mental health conditions like depression and anxiety  Your doctor will talk to you about the different types of screening tests. They can help you decide which screenings to have. They can also explain what the results might mean.   Answering questions - The physical is a good time to ask the doctor or nurse questions about your health. If needed, they can refer you to other doctors or specialists, too.  Adults older than 65 years often need other care, too. As you get older, your doctor will talk to you about:   How to prevent falling at " home   Hearing or vision tests   Memory testing   How to take your medicines safely   Making sure that you have the help and support you need at home  All topics are updated as new evidence becomes available and our peer review process is complete.  This topic retrieved from [employer] on: May 02, 2024.  Topic 078796 Version 1.0  Release: 32.4.3 - C32.122  © 2024 UpToDate, Inc. and/or its affiliates. All rights reserved.  Consumer Information Use and Disclaimer   Disclaimer: This generalized information is a limited summary of diagnosis, treatment, and/or medication information. It is not meant to be comprehensive and should be used as a tool to help the user understand and/or assess potential diagnostic and treatment options. It does NOT include all information about conditions, treatments, medications, side effects, or risks that may apply to a specific patient. It is not intended to be medical advice or a substitute for the medical advice, diagnosis, or treatment of a health care provider based on the health care provider's examination and assessment of a patient's specific and unique circumstances. Patients must speak with a health care provider for complete information about their health, medical questions, and treatment options, including any risks or benefits regarding use of medications. This information does not endorse any treatments or medications as safe, effective, or approved for treating a specific patient. UpToDate, Inc. and its affiliates disclaim any warranty or liability relating to this information or the use thereof.The use of this information is governed by the Terms of Use, available at https://www.[employer] Platformu[employer].com/en/know/clinical-effectiveness-terms. 2024© UpToDate, Inc. and its affiliates and/or licensors. All rights reserved.  Copyright   © 2024 UpToDate, Inc. and/or its affiliates. All rights reserved.

## 2025-03-07 NOTE — ASSESSMENT & PLAN NOTE
Not on a statin. Patient will continue to limit fatty food intake. She will work on increasing exercise. Current calculated ASCVD risk is low at 1.5%. Recheck with next year's labs.  Orders:    Comprehensive metabolic panel; Future    Lipid Panel with Direct LDL reflex; Future

## 2025-03-07 NOTE — ASSESSMENT & PLAN NOTE
Patient will continue to limit carbohydrate intake. She recently started intermittent fasting as well. She will work on increasing exercise. Will recheck A1c in 6 months and then again with annual labs.   Orders:    Hemoglobin A1C; Future    CBC and differential; Future    Comprehensive metabolic panel; Future    Hemoglobin A1C; Future    TSH, 3rd generation with Free T4 reflex; Future

## 2025-03-07 NOTE — PROGRESS NOTES
Adult Annual Physical  Name: Adelita Bingham      : 1970      MRN: [id number]  Encounter Provider: Ivonne Flowers PA-C  Encounter Date: 3/7/2025   Encounter department: [facility]    Assessment & Plan  Annual physical exam         Prediabetes  Patient will continue to limit carbohydrate intake. She recently started intermittent fasting as well. She will work on increasing exercise. Will recheck A1c in 6 months and then again with annual labs.   Orders:    Hemoglobin A1C; Future    CBC and differential; Future    Comprehensive metabolic panel; Future    Hemoglobin A1C; Future    TSH, 3rd generation with Free T4 reflex; Future    Takes dietary supplements  Patient will send update with exact amounts of what she is taking. She was discouraged from increasing vitamin D supplement further.   Orders:    Vitamin D 25 hydroxy; Future    Dyslipidemia  Not on a statin. Patient will continue to limit fatty food intake. She will work on increasing exercise. Current calculated ASCVD risk is low at 1.5%. Recheck with next year's labs.  Orders:    Comprehensive metabolic panel; Future    Lipid Panel with Direct LDL reflex; Future    Overweight  BMI Counseling: Body mass index is 25.5 kg/m². The BMI is above normal. Nutrition recommendations include moderation in carbohydrate intake. Exercise recommendations include exercising 3-5 times per week.  She recently started intermittent fasting as well. She will work on increasing exercise.           Immunizations and preventive care screenings were discussed with patient today. Appropriate education was printed on patient's after visit summary.    Counseling:  Exercise: the importance of regular exercise/physical activity was discussed. Recommend exercise 3-5 times per week for at least 30 minutes.          History of Present Illness     Adult Annual Physical:  Patient presents for annual physical.     Notes that she has recurrent issues with her  "left lower back - notes that she aimproved with home PT from     [facility] - last A1c was 6.2% in 2/2025 - trying to limit carb and trying to walk regularly    Dyslipidemia - minimally elevated LDL of 107 in 2/2025    .     Diet and Physical Activity:  - Diet/Nutrition: well balanced diet.  - Exercise: walking.    Depression Screening:  - PHQ-2 Score: 0    General Health:  - Sleep: sleeps well and 7-8 hours of sleep on average.  - Hearing: normal hearing bilateral ears.  - Vision: most recent eye exam > 1 year ago and wears glasses. wears glasses at work  - Dental: regular dental visits.    /GYN Health:  - Follows with GYN: yes.   - Menopause: postmenopausal.   - Contraception: menopause.      Advanced Care Planning:  - Has an advanced directive?: no    - Has a durable medical POA?: no    - ACP document given to patient?: yes      Review of Systems   Constitutional:  Negative for chills and fever.   HENT:  Negative for congestion, rhinorrhea and sore throat.    Eyes:  Negative for visual disturbance.   Respiratory:  Negative for cough, shortness of breath and wheezing.    Cardiovascular:  Negative for chest pain, palpitations and leg swelling.   Gastrointestinal:  Negative for abdominal pain, blood in stool, constipation, diarrhea, nausea and vomiting.   Genitourinary:  Negative for dysuria and frequency.   Musculoskeletal:  Positive for back pain. Negative for arthralgias and myalgias.   Skin:  Negative for rash.   Neurological:  Negative for dizziness, syncope and headaches.   Hematological:  Does not bruise/bleed easily.   Psychiatric/Behavioral:  Negative for dysphoric mood. The patient is not nervous/anxious.      Medical History Reviewed by provider this encounter:  Tobacco  Allergies  Meds  Surg Hx  Fam Hx  Soc Hx    .    Objective   /68 (BP Location: Left arm, Patient Position: Sitting, Cuff Size: Standard)   Pulse 80   Temp 97.8 °F (36.6 °C) (Tympanic)   Resp 18   Ht 5' 2\" (1.575 m)   Wt 63.2 kg " (139 lb 6.4 oz)   LMP 01/11/2021   SpO2 100%   BMI 25.50 kg/m²     Physical Exam  Vitals reviewed.   Constitutional:       General: She is not in acute distress.     Appearance: Normal appearance. She is well-developed. She is not ill-appearing.   HENT:      Head: Normocephalic and atraumatic.      Right Ear: Tympanic membrane, ear canal and external ear normal.      Left Ear: Tympanic membrane, ear canal and external ear normal.      Mouth/Throat:      Mouth: Mucous membranes are moist.      Pharynx: No oropharyngeal exudate or posterior oropharyngeal erythema.   Eyes:      Conjunctiva/sclera: Conjunctivae normal.      Pupils: Pupils are equal, round, and reactive to light.   Neck:      Thyroid: No thyromegaly.      Vascular: No carotid bruit.   Cardiovascular:      Rate and Rhythm: Normal rate and regular rhythm.      Pulses: Normal pulses.      Heart sounds: Normal heart sounds. No murmur heard.  Pulmonary:      Effort: Pulmonary effort is normal.      Breath sounds: Normal breath sounds. No wheezing, rhonchi or rales.   Abdominal:      General: Bowel sounds are normal. There is no distension.      Palpations: Abdomen is soft. There is no mass.      Tenderness: There is no abdominal tenderness. There is no guarding.   Musculoskeletal:      Cervical back: Normal range of motion and neck supple.      Right lower leg: No edema.      Left lower leg: No edema.   Lymphadenopathy:      Cervical: No cervical adenopathy.   Skin:     General: Skin is warm and dry.      Findings: No rash.   Neurological:      Mental Status: She is alert.      Sensory: No sensory deficit.      Motor: No weakness.      Comments: 5/5 strength in UE and LE   Psychiatric:         Mood and Affect: Mood normal.         Behavior: Behavior normal.         Thought Content: Thought content normal.         Judgment: Judgment normal.

## 2025-03-17 PROBLEM — S61.210A LACERATION OF RIGHT INDEX FINGER WITHOUT FOREIGN BODY WITHOUT DAMAGE TO NAIL, INITIAL ENCOUNTER: Status: RESOLVED | Noted: 2025-02-15 | Resolved: 2025-03-17

## 2025-04-04 ENCOUNTER — ANNUAL EXAM (OUTPATIENT)
Dept: GYNECOLOGY | Facility: CLINIC | Age: 55
End: 2025-04-04
Payer: COMMERCIAL

## 2025-04-04 VITALS
DIASTOLIC BLOOD PRESSURE: 70 MMHG | BODY MASS INDEX: 25.51 KG/M2 | WEIGHT: 138.6 LBS | SYSTOLIC BLOOD PRESSURE: 110 MMHG | HEART RATE: 75 BPM | HEIGHT: 62 IN

## 2025-04-04 DIAGNOSIS — Z01.419 ENCOUNTER FOR GYNECOLOGICAL EXAMINATION WITHOUT ABNORMAL FINDING: Primary | ICD-10-CM

## 2025-04-04 DIAGNOSIS — N95.2 ATROPHIC VAGINITIS: ICD-10-CM

## 2025-04-04 DIAGNOSIS — Z12.31 ENCOUNTER FOR SCREENING MAMMOGRAM FOR MALIGNANT NEOPLASM OF BREAST: ICD-10-CM

## 2025-04-04 PROCEDURE — S0612 ANNUAL GYNECOLOGICAL EXAMINA: HCPCS | Performed by: OBSTETRICS & GYNECOLOGY

## 2025-04-04 PROCEDURE — G0145 SCR C/V CYTO,THINLAYER,RESCR: HCPCS | Performed by: OBSTETRICS & GYNECOLOGY

## 2025-04-04 NOTE — PROGRESS NOTES
Name: Adelita Bingham      : 1970      MRN: [id number]  Encounter Provider: Fransisco Harris DO  Encounter Date: 2025   Encounter department: [facility]  :  Assessment & Plan  Encounter for screening mammogram for malignant neoplasm of breast         Encounter for gynecological examination without abnormal finding    Orders:    Liquid-based pap, screening    Atrophic vaginitis  Continue Estrace cream           History of Present Illness   HPI  Adelita Bingham is a 55 y.o. female who presents for annual examination.  She offers no complaints.  Denies any vaginal irritation, burning, discharge or bleeding.  Denies any dysuria, hematuria urgency urinary incontinence.  She has been using Estrace cream 1 g vaginally weekly.    Colonoscopy 2021.  Repeat in 5 years secondary to polyps.    DEXA scan 2024.  Normal      Review of Systems   Constitutional: Negative.    HENT:  Negative for sore throat and trouble swallowing.    Gastrointestinal: Negative.    Genitourinary: Negative.           Objective   LMP 2021      Physical Exam  Vitals reviewed.   Cardiovascular:      Rate and Rhythm: Normal rate and regular rhythm.      Pulses: Normal pulses.      Heart sounds: Normal heart sounds.   Pulmonary:      Effort: Pulmonary effort is normal. No respiratory distress.      Breath sounds: Normal breath sounds.   Chest:   Breasts:     Right: No swelling, bleeding, inverted nipple, mass, nipple discharge, skin change or tenderness.      Left: No swelling, bleeding, inverted nipple, mass, nipple discharge, skin change or tenderness.   Abdominal:      General: There is no distension.      Palpations: Abdomen is soft. There is no mass.      Tenderness: There is no abdominal tenderness. There is no guarding or rebound.      Hernia: No hernia is present. There is no hernia in the left inguinal area or right inguinal area.   Genitourinary:     General: Normal vulva.       Labia:         Right: No rash, tenderness or lesion.         Left: No rash, tenderness or lesion.       Vagina: Normal.      Cervix: Normal.      Uterus: Normal.       Adnexa:         Right: No mass, tenderness or fullness.          Left: No mass, tenderness or fullness.     Musculoskeletal:      Cervical back: Normal range of motion and neck supple. No tenderness.   Lymphadenopathy:      Cervical: No cervical adenopathy.      Upper Body:      Right upper body: No supraclavicular, axillary or pectoral adenopathy.      Left upper body: No supraclavicular, axillary or pectoral adenopathy.      Lower Body: No right inguinal adenopathy. No left inguinal adenopathy.   Neurological:      Mental Status: She is alert.

## 2025-04-10 LAB
LAB AP GYN PRIMARY INTERPRETATION: NORMAL
Lab: NORMAL

## 2025-05-09 DIAGNOSIS — N95.2 ATROPHIC VAGINITIS: ICD-10-CM

## 2025-05-09 RX ORDER — ESTRADIOL 0.1 MG/G
1 CREAM VAGINAL WEEKLY
Qty: 42.5 G | Refills: 1 | Status: CANCELLED | OUTPATIENT
Start: 2025-05-09

## 2025-05-09 NOTE — TELEPHONE ENCOUNTER
Pt called back after speaking to [facility]. Pt states that she need a script for Estradiol 0.1 mg vaginal cream with additional waste. Pt also needs on the script how many days the medication is usage.

## 2025-05-09 NOTE — TELEPHONE ENCOUNTER
Patient called the RX Refill Line. Message is being forwarded to the office.     Patient received estradiol vaginal cream from mail order states  42.5 gm tube is a 398 days supply. Can not get filled for over a year. Not sure if office can check how sent. I suggested patient contact mail order to let them know what day supply is listed     Please contact patient at [phone]

## 2025-05-16 NOTE — TELEPHONE ENCOUNTER
Patient states she is running very low on her Estradiol 0.1 mg vaginal cream insert 1 g into the vagina once a week.     Patient said pharmacy will not give her a refill until the end of July. She does have 3 refills but the prescription will  10/22/25.     She said if a new prescription is sent it needs to say with additional waste.

## 2025-06-13 ENCOUNTER — HOSPITAL ENCOUNTER (OUTPATIENT)
Dept: MAMMOGRAPHY | Facility: MEDICAL CENTER | Age: 55
Discharge: HOME | End: 2025-06-13
Payer: COMMERCIAL

## 2025-06-13 VITALS — WEIGHT: 137 LBS | HEIGHT: 62 IN | BODY MASS INDEX: 25.21 KG/M2

## 2025-06-13 DIAGNOSIS — Z12.31 VISIT FOR SCREENING MAMMOGRAM: ICD-10-CM

## 2025-06-13 PROCEDURE — 77067 SCR MAMMO BI INCL CAD: CPT

## 2025-06-13 PROCEDURE — 77063 BREAST TOMOSYNTHESIS BI: CPT
